# Patient Record
Sex: FEMALE | Race: WHITE | NOT HISPANIC OR LATINO | Employment: UNEMPLOYED | URBAN - METROPOLITAN AREA
[De-identification: names, ages, dates, MRNs, and addresses within clinical notes are randomized per-mention and may not be internally consistent; named-entity substitution may affect disease eponyms.]

---

## 2021-08-30 PROBLEM — D68.00 VON WILLEBRAND DISEASE (HCC): Status: ACTIVE | Noted: 2021-08-30

## 2024-01-08 ENCOUNTER — TELEPHONE (OUTPATIENT)
Age: 24
End: 2024-01-08

## 2024-01-08 NOTE — TELEPHONE ENCOUNTER
Pt returned office phone call. She is scheduling her physical- spoke with office clerical who states ok to make appointment longer.     Pt was out of state with  in the [employer]. She would like to discuss her health. Pt will need to update her last name and insurance information. I believe that the insurance is coming up as rejected because her last name has changed.     Beneficiary DOD # [id number] Benefits ID # [id number]. Please update insurance information once correct documentation is brought in.

## 2024-01-09 ENCOUNTER — OFFICE VISIT (OUTPATIENT)
Dept: FAMILY MEDICINE CLINIC | Facility: CLINIC | Age: 24
End: 2024-01-09
Payer: OTHER GOVERNMENT

## 2024-01-09 VITALS
OXYGEN SATURATION: 100 % | DIASTOLIC BLOOD PRESSURE: 74 MMHG | WEIGHT: 115 LBS | SYSTOLIC BLOOD PRESSURE: 114 MMHG | BODY MASS INDEX: 21.71 KG/M2 | TEMPERATURE: 98.4 F | HEIGHT: 61 IN | HEART RATE: 87 BPM

## 2024-01-09 DIAGNOSIS — Z13.220 SCREENING FOR LIPOID DISORDERS: ICD-10-CM

## 2024-01-09 DIAGNOSIS — N93.8 DYSFUNCTIONAL UTERINE BLEEDING: Primary | ICD-10-CM

## 2024-01-09 PROCEDURE — 99213 OFFICE O/P EST LOW 20 MIN: CPT | Performed by: FAMILY MEDICINE

## 2024-01-09 NOTE — PROGRESS NOTES
"Name: Cece Butler      : 2000      MRN: [id number]  Encounter Provider: Alondra Peralta DO  Encounter Date: 2024   Encounter department: [facility]    Assessment & Plan     1. Dysfunctional uterine bleeding  Comments:  labs as ordered  refer to gyn  Orders:  -     CBC and differential; Future  -     Comprehensive metabolic panel; Future  -     TSH, 3rd generation; Future  -     Iron Panel (Includes Ferritin, Iron Sat%, Iron, and TIBC); Future  -     Ambulatory Referral to Obstetrics / Gynecology; Future  -     hCG, quantitative; Future    2. Screening for lipoid disorders  -     Lipid panel; Future           Subjective      HPI  Pt presents c/o period lasting 2 weeks.  Post-partum, she was on depo but stopped and has had regular periods until now.  Had been somewhat irregular before baby 2 yrs ago.  Would like referral back to gyn.  No abd pain.        Review of Systems  See hpi  Current Outpatient Medications on File Prior to Visit   Medication Sig    [DISCONTINUED] medroxyPROGESTERone (DEPO-PROVERA) 150 mg/mL injection Inject 1 mL (150 mg total) into a muscle once for 1 dose       Objective     /74 (BP Location: Right arm, Patient Position: Sitting, Cuff Size: Standard)   Pulse 87   Temp 98.4 °F (36.9 °C) (Temporal)   Ht 5' 1\" (1.549 m)   Wt 52.2 kg (115 lb)   SpO2 100%   BMI 21.73 kg/m²     Physical Exam  Constitutional:       Appearance: Normal appearance.   HENT:      Head: Normocephalic and atraumatic.   Neck:      Thyroid: No thyromegaly.   Cardiovascular:      Rate and Rhythm: Normal rate and regular rhythm.      Heart sounds: No murmur heard.     No friction rub. No gallop.   Pulmonary:      Effort: Pulmonary effort is normal.      Breath sounds: Normal breath sounds. No wheezing, rhonchi or rales.   Neurological:      General: No focal deficit present.      Mental Status: She is alert and oriented to person, place, and time.       Alondra Peralta DO    "

## 2024-01-10 ENCOUNTER — APPOINTMENT (OUTPATIENT)
Dept: LAB | Facility: CLINIC | Age: 24
End: 2024-01-10
Payer: OTHER GOVERNMENT

## 2024-01-10 ENCOUNTER — PATIENT MESSAGE (OUTPATIENT)
Dept: FAMILY MEDICINE CLINIC | Facility: CLINIC | Age: 24
End: 2024-01-10

## 2024-01-10 DIAGNOSIS — N93.8 DYSFUNCTIONAL UTERINE BLEEDING: ICD-10-CM

## 2024-01-10 DIAGNOSIS — Z13.220 SCREENING FOR LIPOID DISORDERS: ICD-10-CM

## 2024-01-10 LAB
ALBUMIN SERPL BCP-MCNC: 4.7 G/DL (ref 3.5–5)
ALP SERPL-CCNC: 40 U/L (ref 34–104)
ALT SERPL W P-5'-P-CCNC: 8 U/L (ref 7–52)
ANION GAP SERPL CALCULATED.3IONS-SCNC: 7 MMOL/L
AST SERPL W P-5'-P-CCNC: 13 U/L (ref 13–39)
B-HCG SERPL-ACNC: <1 MIU/ML (ref 0–5)
BASOPHILS # BLD AUTO: 0.04 THOUSANDS/ÂΜL (ref 0–0.1)
BASOPHILS NFR BLD AUTO: 1 % (ref 0–1)
BILIRUB SERPL-MCNC: 0.71 MG/DL (ref 0.2–1)
BUN SERPL-MCNC: 7 MG/DL (ref 5–25)
CALCIUM SERPL-MCNC: 9.3 MG/DL (ref 8.4–10.2)
CHLORIDE SERPL-SCNC: 107 MMOL/L (ref 96–108)
CHOLEST SERPL-MCNC: 174 MG/DL
CO2 SERPL-SCNC: 25 MMOL/L (ref 21–32)
CREAT SERPL-MCNC: 0.62 MG/DL (ref 0.6–1.3)
EOSINOPHIL # BLD AUTO: 0.31 THOUSAND/ÂΜL (ref 0–0.61)
EOSINOPHIL NFR BLD AUTO: 6 % (ref 0–6)
ERYTHROCYTE [DISTWIDTH] IN BLOOD BY AUTOMATED COUNT: 12.5 % (ref 11.6–15.1)
FERRITIN SERPL-MCNC: 20 NG/ML (ref 11–307)
GFR SERPL CREATININE-BSD FRML MDRD: 128 ML/MIN/1.73SQ M
GLUCOSE P FAST SERPL-MCNC: 81 MG/DL (ref 65–99)
HCT VFR BLD AUTO: 41.9 % (ref 34.8–46.1)
HDLC SERPL-MCNC: 52 MG/DL
HGB BLD-MCNC: 13.7 G/DL (ref 11.5–15.4)
IMM GRANULOCYTES # BLD AUTO: 0.01 THOUSAND/UL (ref 0–0.2)
IMM GRANULOCYTES NFR BLD AUTO: 0 % (ref 0–2)
IRON SATN MFR SERPL: 31 % (ref 15–50)
IRON SERPL-MCNC: 109 UG/DL (ref 50–212)
LDLC SERPL CALC-MCNC: 105 MG/DL (ref 0–100)
LYMPHOCYTES # BLD AUTO: 2.18 THOUSANDS/ÂΜL (ref 0.6–4.47)
LYMPHOCYTES NFR BLD AUTO: 43 % (ref 14–44)
MCH RBC QN AUTO: 30.1 PG (ref 26.8–34.3)
MCHC RBC AUTO-ENTMCNC: 32.7 G/DL (ref 31.4–37.4)
MCV RBC AUTO: 92 FL (ref 82–98)
MONOCYTES # BLD AUTO: 0.27 THOUSAND/ÂΜL (ref 0.17–1.22)
MONOCYTES NFR BLD AUTO: 5 % (ref 4–12)
NEUTROPHILS # BLD AUTO: 2.31 THOUSANDS/ÂΜL (ref 1.85–7.62)
NEUTS SEG NFR BLD AUTO: 45 % (ref 43–75)
NONHDLC SERPL-MCNC: 122 MG/DL
NRBC BLD AUTO-RTO: 0 /100 WBCS
PLATELET # BLD AUTO: 250 THOUSANDS/UL (ref 149–390)
PMV BLD AUTO: 11.7 FL (ref 8.9–12.7)
POTASSIUM SERPL-SCNC: 3.8 MMOL/L (ref 3.5–5.3)
PROT SERPL-MCNC: 6.6 G/DL (ref 6.4–8.4)
RBC # BLD AUTO: 4.55 MILLION/UL (ref 3.81–5.12)
SODIUM SERPL-SCNC: 139 MMOL/L (ref 135–147)
TIBC SERPL-MCNC: 356 UG/DL (ref 250–450)
TRIGL SERPL-MCNC: 85 MG/DL
TSH SERPL DL<=0.05 MIU/L-ACNC: 0.54 UIU/ML (ref 0.45–4.5)
UIBC SERPL-MCNC: 247 UG/DL (ref 155–355)
WBC # BLD AUTO: 5.12 THOUSAND/UL (ref 4.31–10.16)

## 2024-01-10 PROCEDURE — 84702 CHORIONIC GONADOTROPIN TEST: CPT

## 2024-01-10 PROCEDURE — 36415 COLL VENOUS BLD VENIPUNCTURE: CPT

## 2024-01-10 PROCEDURE — 83550 IRON BINDING TEST: CPT

## 2024-01-10 PROCEDURE — 84443 ASSAY THYROID STIM HORMONE: CPT

## 2024-01-10 PROCEDURE — 85025 COMPLETE CBC W/AUTO DIFF WBC: CPT

## 2024-01-10 PROCEDURE — 83540 ASSAY OF IRON: CPT

## 2024-01-10 PROCEDURE — 80061 LIPID PANEL: CPT

## 2024-01-10 PROCEDURE — 80053 COMPREHEN METABOLIC PANEL: CPT

## 2024-01-10 PROCEDURE — 82728 ASSAY OF FERRITIN: CPT

## 2024-01-19 DIAGNOSIS — Z30.42 ENCOUNTER FOR SURVEILLANCE OF INJECTABLE CONTRACEPTIVE: Primary | ICD-10-CM

## 2024-01-19 RX ORDER — MEDROXYPROGESTERONE ACETATE 150 MG/ML
150 INJECTION, SUSPENSION INTRAMUSCULAR
Qty: 1 ML | Refills: 3 | Status: SHIPPED | OUTPATIENT
Start: 2024-01-19

## 2024-01-23 ENCOUNTER — CLINICAL SUPPORT (OUTPATIENT)
Dept: OBGYN CLINIC | Facility: CLINIC | Age: 24
End: 2024-01-23
Payer: OTHER GOVERNMENT

## 2024-01-23 VITALS — DIASTOLIC BLOOD PRESSURE: 60 MMHG | SYSTOLIC BLOOD PRESSURE: 90 MMHG

## 2024-01-23 DIAGNOSIS — Z30.42 ENCOUNTER FOR MANAGEMENT AND INJECTION OF DEPO-PROVERA: Primary | ICD-10-CM

## 2024-01-23 PROCEDURE — 96372 THER/PROPH/DIAG INJ SC/IM: CPT

## 2024-01-23 RX ORDER — MEDROXYPROGESTERONE ACETATE 150 MG/ML
150 INJECTION, SUSPENSION INTRAMUSCULAR ONCE
Status: COMPLETED | OUTPATIENT
Start: 2024-01-23 | End: 2024-01-23

## 2024-01-23 RX ADMIN — MEDROXYPROGESTERONE ACETATE 150 MG: 150 INJECTION, SUSPENSION INTRAMUSCULAR at 10:12

## 2024-01-23 NOTE — PROGRESS NOTES
Cece presents to the office for the management and administration of DepoProvera. She last had the injection 10/27/2022 while living in [place], and decided that she wanted to take some time off of it. She has recently moved back to the area.     Last yearly: N/A  Last DepoProvera: 10/27/2022  S/Sx: patient reported headaches  UPT: negative  Next yearly: 01/25/2024  Next DepoProvera: 12 weeks from now    DepoProvera 150mg/1mL given in the Left Ventrogluteal per patient's request  NDC: 65625-175-50  LOT #: [id number]  EXP 09/27/2027    Injection given without incident. Patient given dates of when her next injection should be given. Will schedule at another time.

## 2024-02-15 ENCOUNTER — APPOINTMENT (EMERGENCY)
Dept: ULTRASOUND IMAGING | Facility: HOSPITAL | Age: 24
End: 2024-02-15
Payer: COMMERCIAL

## 2024-02-15 ENCOUNTER — HOSPITAL ENCOUNTER (EMERGENCY)
Facility: HOSPITAL | Age: 24
Discharge: HOME | End: 2024-02-15
Attending: EMERGENCY MEDICINE
Payer: COMMERCIAL

## 2024-02-15 VITALS
OXYGEN SATURATION: 97 % | RESPIRATION RATE: 20 BRPM | TEMPERATURE: 97.8 F | SYSTOLIC BLOOD PRESSURE: 120 MMHG | DIASTOLIC BLOOD PRESSURE: 79 MMHG | HEART RATE: 58 BPM

## 2024-02-15 DIAGNOSIS — Z34.90 INTRAUTERINE PREGNANCY: Primary | ICD-10-CM

## 2024-02-15 DIAGNOSIS — R79.89 KETONEMIA: ICD-10-CM

## 2024-02-15 DIAGNOSIS — R80.9 PROTEINURIA: ICD-10-CM

## 2024-02-15 DIAGNOSIS — R10.9 ACUTE ABDOMINAL PAIN: ICD-10-CM

## 2024-02-15 DIAGNOSIS — R11.2 NAUSEA AND VOMITING: ICD-10-CM

## 2024-02-15 PROBLEM — O21.9 NAUSEA AND VOMITING DURING PREGNANCY PRIOR TO 22 WEEKS GESTATION: Status: ACTIVE | Noted: 2024-02-15

## 2024-02-15 PROBLEM — Z3A.01 LESS THAN 8 WEEKS GESTATION OF PREGNANCY: Status: ACTIVE | Noted: 2024-02-15

## 2024-02-15 LAB
ALBUMIN SERPL BCP-MCNC: 5.1 G/DL (ref 3.5–5)
ALP SERPL-CCNC: 38 U/L (ref 34–104)
ALT SERPL W P-5'-P-CCNC: 7 U/L (ref 7–52)
ANION GAP SERPL CALCULATED.3IONS-SCNC: 15 MMOL/L
AST SERPL W P-5'-P-CCNC: 12 U/L (ref 13–39)
B-HCG SERPL-ACNC: ABNORMAL MIU/ML (ref 0–5)
BACTERIA UR QL AUTO: ABNORMAL /HPF
BASOPHILS # BLD AUTO: 0.03 THOUSANDS/ÂΜL (ref 0–0.1)
BASOPHILS NFR BLD AUTO: 0 % (ref 0–1)
BILIRUB SERPL-MCNC: 1.08 MG/DL (ref 0.2–1)
BILIRUB UR QL STRIP: NEGATIVE
BUN SERPL-MCNC: 10 MG/DL (ref 5–25)
CALCIUM SERPL-MCNC: 10.2 MG/DL (ref 8.4–10.2)
CHLORIDE SERPL-SCNC: 102 MMOL/L (ref 96–108)
CLARITY UR: ABNORMAL
CO2 SERPL-SCNC: 19 MMOL/L (ref 21–32)
COLOR UR: YELLOW
CREAT SERPL-MCNC: 0.5 MG/DL (ref 0.6–1.3)
EOSINOPHIL # BLD AUTO: 0 THOUSAND/ÂΜL (ref 0–0.61)
EOSINOPHIL NFR BLD AUTO: 0 % (ref 0–6)
ERYTHROCYTE [DISTWIDTH] IN BLOOD BY AUTOMATED COUNT: 11.9 % (ref 11.6–15.1)
EXT PREGNANCY TEST URINE: POSITIVE
EXT. CONTROL: ABNORMAL
GFR SERPL CREATININE-BSD FRML MDRD: 137 ML/MIN/1.73SQ M
GLUCOSE SERPL-MCNC: 106 MG/DL (ref 65–140)
GLUCOSE UR STRIP-MCNC: ABNORMAL MG/DL
HCG SERPL QL: POSITIVE
HCT VFR BLD AUTO: 40 % (ref 34.8–46.1)
HGB BLD-MCNC: 14.5 G/DL (ref 11.5–15.4)
HGB UR QL STRIP.AUTO: ABNORMAL
IMM GRANULOCYTES # BLD AUTO: 0.05 THOUSAND/UL (ref 0–0.2)
IMM GRANULOCYTES NFR BLD AUTO: 1 % (ref 0–2)
KETONES UR STRIP-MCNC: ABNORMAL MG/DL
LEUKOCYTE ESTERASE UR QL STRIP: NEGATIVE
LIPASE SERPL-CCNC: <6 U/L (ref 11–82)
LYMPHOCYTES # BLD AUTO: 1.34 THOUSANDS/ÂΜL (ref 0.6–4.47)
LYMPHOCYTES NFR BLD AUTO: 13 % (ref 14–44)
MCH RBC QN AUTO: 31.1 PG (ref 26.8–34.3)
MCHC RBC AUTO-ENTMCNC: 36.3 G/DL (ref 31.4–37.4)
MCV RBC AUTO: 86 FL (ref 82–98)
MONOCYTES # BLD AUTO: 0.31 THOUSAND/ÂΜL (ref 0.17–1.22)
MONOCYTES NFR BLD AUTO: 3 % (ref 4–12)
MUCOUS THREADS UR QL AUTO: ABNORMAL
NEUTROPHILS # BLD AUTO: 8.99 THOUSANDS/ÂΜL (ref 1.85–7.62)
NEUTS SEG NFR BLD AUTO: 83 % (ref 43–75)
NITRITE UR QL STRIP: NEGATIVE
NON-SQ EPI CELLS URNS QL MICRO: ABNORMAL /HPF
NRBC BLD AUTO-RTO: 0 /100 WBCS
PH UR STRIP.AUTO: 6 [PH]
PLATELET # BLD AUTO: 353 THOUSANDS/UL (ref 149–390)
PMV BLD AUTO: 11.7 FL (ref 8.9–12.7)
POTASSIUM SERPL-SCNC: 3.4 MMOL/L (ref 3.5–5.3)
PROT SERPL-MCNC: 7.6 G/DL (ref 6.4–8.4)
PROT UR STRIP-MCNC: ABNORMAL MG/DL
RBC # BLD AUTO: 4.66 MILLION/UL (ref 3.81–5.12)
RBC #/AREA URNS AUTO: ABNORMAL /HPF
SODIUM SERPL-SCNC: 136 MMOL/L (ref 135–147)
SP GR UR STRIP.AUTO: 1.04 (ref 1–1.03)
UROBILINOGEN UR STRIP-ACNC: 2 MG/DL
WBC # BLD AUTO: 10.72 THOUSAND/UL (ref 4.31–10.16)
WBC #/AREA URNS AUTO: ABNORMAL /HPF

## 2024-02-15 PROCEDURE — 84703 CHORIONIC GONADOTROPIN ASSAY: CPT | Performed by: EMERGENCY MEDICINE

## 2024-02-15 PROCEDURE — 80053 COMPREHEN METABOLIC PANEL: CPT | Performed by: EMERGENCY MEDICINE

## 2024-02-15 PROCEDURE — 85025 COMPLETE CBC W/AUTO DIFF WBC: CPT | Performed by: EMERGENCY MEDICINE

## 2024-02-15 PROCEDURE — 83690 ASSAY OF LIPASE: CPT | Performed by: EMERGENCY MEDICINE

## 2024-02-15 PROCEDURE — 81001 URINALYSIS AUTO W/SCOPE: CPT | Performed by: EMERGENCY MEDICINE

## 2024-02-15 PROCEDURE — 36415 COLL VENOUS BLD VENIPUNCTURE: CPT | Performed by: EMERGENCY MEDICINE

## 2024-02-15 PROCEDURE — 96361 HYDRATE IV INFUSION ADD-ON: CPT

## 2024-02-15 PROCEDURE — 93005 ELECTROCARDIOGRAM TRACING: CPT

## 2024-02-15 PROCEDURE — 96375 TX/PRO/DX INJ NEW DRUG ADDON: CPT

## 2024-02-15 PROCEDURE — 76801 OB US < 14 WKS SINGLE FETUS: CPT

## 2024-02-15 PROCEDURE — 96365 THER/PROPH/DIAG IV INF INIT: CPT

## 2024-02-15 PROCEDURE — 81025 URINE PREGNANCY TEST: CPT | Performed by: EMERGENCY MEDICINE

## 2024-02-15 PROCEDURE — NC001 PR NO CHARGE: Performed by: OBSTETRICS & GYNECOLOGY

## 2024-02-15 PROCEDURE — 99284 EMERGENCY DEPT VISIT MOD MDM: CPT

## 2024-02-15 PROCEDURE — 84702 CHORIONIC GONADOTROPIN TEST: CPT | Performed by: EMERGENCY MEDICINE

## 2024-02-15 PROCEDURE — 99285 EMERGENCY DEPT VISIT HI MDM: CPT | Performed by: EMERGENCY MEDICINE

## 2024-02-15 RX ORDER — ACETAMINOPHEN 10 MG/ML
1000 INJECTION, SOLUTION INTRAVENOUS ONCE
Status: COMPLETED | OUTPATIENT
Start: 2024-02-15 | End: 2024-02-15

## 2024-02-15 RX ORDER — METOCLOPRAMIDE 10 MG/1
10 TABLET ORAL 4 TIMES DAILY
Qty: 120 TABLET | Refills: 0 | Status: SHIPPED | OUTPATIENT
Start: 2024-02-15 | End: 2024-03-16

## 2024-02-15 RX ORDER — ONDANSETRON 4 MG/1
4 TABLET, FILM COATED ORAL EVERY 8 HOURS PRN
Qty: 12 TABLET
Start: 2024-02-15

## 2024-02-15 RX ORDER — PYRIDOXINE HCL (VITAMIN B6) 50 MG
25 TABLET ORAL DAILY
Status: DISCONTINUED | OUTPATIENT
Start: 2024-02-15 | End: 2024-02-15 | Stop reason: HOSPADM

## 2024-02-15 RX ORDER — ONDANSETRON 2 MG/ML
4 INJECTION INTRAMUSCULAR; INTRAVENOUS ONCE
Status: COMPLETED | OUTPATIENT
Start: 2024-02-15 | End: 2024-02-15

## 2024-02-15 RX ORDER — DEXTROSE AND SODIUM CHLORIDE 5; .9 G/100ML; G/100ML
100 INJECTION, SOLUTION INTRAVENOUS CONTINUOUS
Status: DISCONTINUED | OUTPATIENT
Start: 2024-02-15 | End: 2024-02-15 | Stop reason: HOSPADM

## 2024-02-15 RX ORDER — METOCLOPRAMIDE HYDROCHLORIDE 5 MG/ML
10 INJECTION INTRAMUSCULAR; INTRAVENOUS ONCE
Status: COMPLETED | OUTPATIENT
Start: 2024-02-15 | End: 2024-02-15

## 2024-02-15 RX ORDER — DROPERIDOL 2.5 MG/ML
0.62 INJECTION, SOLUTION INTRAMUSCULAR; INTRAVENOUS ONCE
Status: DISCONTINUED | OUTPATIENT
Start: 2024-02-15 | End: 2024-02-15

## 2024-02-15 RX ADMIN — METOCLOPRAMIDE 10 MG: 5 INJECTION, SOLUTION INTRAMUSCULAR; INTRAVENOUS at 14:38

## 2024-02-15 RX ADMIN — ACETAMINOPHEN 1000 MG: 1000 INJECTION INTRAVENOUS at 15:04

## 2024-02-15 RX ADMIN — DEXTROSE AND SODIUM CHLORIDE 100 ML/HR: 5; .9 INJECTION, SOLUTION INTRAVENOUS at 16:24

## 2024-02-15 RX ADMIN — ONDANSETRON 4 MG: 2 INJECTION INTRAMUSCULAR; INTRAVENOUS at 17:38

## 2024-02-15 RX ADMIN — SODIUM CHLORIDE 500 ML: 0.9 INJECTION, SOLUTION INTRAVENOUS at 14:20

## 2024-02-15 NOTE — ED PROVIDER NOTES
History  Chief Complaint   Patient presents with    Vomiting     Vomiting and generalized abd pain x3 days.      Patient is a 23-year-old female, with a history significant for tobacco use, von Willebrand's,  section, multiple ED visits for nausea vomiting per my review of the medical record, who presents to the ED today due to a 3-day history of gradual onset, constant, progressively worsening, nonradiating, atraumatic generalized abdominal pain.  There is associated nonbloody emesis, decreased p.o. intake, constipation x 1 week.  Patient states she has not had a menstrual period since around [place] time.  There is no associated fever, chest pain, dyspnea, dysuria, polyuria, vaginal bleeding, vaginal discharge, marijuana use, weakness, numbness.  Patient's mother, present in room and providing collateral history, states that patient is not confused.  She also states that she gave the patient a Reglan and Tums and this did not remit her symptoms.  No clear exacerbating factors.  Patient denies history of abdominal surgery other than the .  Patient is without other concerns at this time.        Prior to Admission Medications   Prescriptions Last Dose Informant Patient Reported? Taking?   medroxyPROGESTERone (DEPO-PROVERA) 150 mg/mL injection   No No   Sig: Inject 1 mL (150 mg total) into a muscle every 3 (three) months      Facility-Administered Medications: None       Past Medical History:   Diagnosis Date    Varicella     immunized    Von Willebrand disease (HCC)        Past Surgical History:   Procedure Laterality Date     SECTION  22    CA  DELIVERY ONLY N/A 2022    Procedure:  SECTION ();  Surgeon: Didi Almazan MD;  Location: [facility] ;  Service: Obstetrics       Family History   Problem Relation Age of Onset    No Known Problems Mother     COPD Father     No Known Problems Sister     No Known Problems Maternal Grandmother     Supraventricular  tachycardia Maternal Grandfather     No Known Problems Paternal Grandmother     Prostate cancer Paternal Grandfather      I have reviewed and agree with the history as documented.    E-Cigarette/Vaping    E-Cigarette Use Former User      E-Cigarette/Vaping Substances    Nicotine No     THC No     CBD No     Flavoring No     Other No     Unknown No      Social History     Tobacco Use    Smoking status: Some Days    Smokeless tobacco: Never   Vaping Use    Vaping status: Former   Substance Use Topics    Alcohol use: Never    Drug use: Yes     Types: Marijuana     Comment: 1.5 years ago       Review of Systems   Constitutional:  Negative for fever.   HENT:  Negative for trouble swallowing.    Eyes:  Negative for visual disturbance.   Respiratory:  Negative for shortness of breath.    Cardiovascular:  Negative for chest pain.   Gastrointestinal:  Positive for abdominal pain, constipation, nausea and vomiting.   Endocrine: Negative for polyuria.   Genitourinary:  Negative for dysuria, vaginal bleeding and vaginal discharge.   Musculoskeletal:  Negative for gait problem.   Skin:  Negative for rash.   Allergic/Immunologic: Negative for environmental allergies.   Neurological:  Negative for weakness and numbness.   Hematological:  Negative for adenopathy.   Psychiatric/Behavioral:  Negative for confusion.    All other systems reviewed and are negative.      Physical Exam  Physical Exam  Vitals and nursing note reviewed.   Constitutional:       General: She is not in acute distress.     Appearance: She is ill-appearing. She is not toxic-appearing.      Comments: Patient appears uncomfortable during my evaluation     Wt Readings from Last 3 Encounters:  01/09/24 : 52.2 kg (115 lb)  03/25/22 : 62 kg (136 lb 9.6 oz)  03/07/22 : 65 kg (143 lb 6.4 oz)      HENT:      Head: Normocephalic and atraumatic.      Right Ear: External ear normal.      Left Ear: External ear normal.      Nose: Nose normal. No rhinorrhea.       Mouth/Throat:      Mouth: Mucous membranes are dry.      Pharynx: Oropharynx is clear. No oropharyngeal exudate or posterior oropharyngeal erythema.      Comments: Uvula midline. No oropharyngeal or submandibular mass/swelling  Eyes:      General: No scleral icterus.        Right eye: No discharge.         Left eye: No discharge.      Conjunctiva/sclera: Conjunctivae normal.      Pupils: Pupils are equal, round, and reactive to light.   Neck:      Comments: Patient is spontaneously rotating their neck to the left and right during the history and physical exam interaction without difficulty or apparent discomfort    Cardiovascular:      Rate and Rhythm: Normal rate and regular rhythm.      Pulses: Normal pulses.      Heart sounds: Normal heart sounds. No murmur heard.     No friction rub. No gallop.      Comments: 2+ Radial  Pulmonary:      Effort: Pulmonary effort is normal. No respiratory distress.      Breath sounds: Normal breath sounds. No stridor. No wheezing, rhonchi or rales.   Abdominal:      General: Abdomen is flat. There is no distension.      Palpations: Abdomen is soft.      Tenderness: There is abdominal tenderness (Generalized). There is left CVA tenderness and guarding (LLQ). There is no right CVA tenderness or rebound.   Musculoskeletal:         General: No deformity.      Cervical back: Neck supple. No tenderness. No muscular tenderness.   Lymphadenopathy:      Cervical: No cervical adenopathy.   Skin:     General: Skin is warm and dry.      Capillary Refill: Capillary refill takes less than 2 seconds.   Neurological:      Mental Status: She is alert.      Comments: Patient is speaking clearly in complete sentences.  Patient is answering appropriately and able follow commands.  Patient is moving all four extremities spontaneously.  No facial droop.  Tongue midline.      Psychiatric:         Mood and Affect: Mood normal.         Behavior: Behavior normal.         Vital Signs  ED Triage Vitals    Temperature Pulse Respirations Blood Pressure SpO2   02/15/24 1355 02/15/24 1355 02/15/24 1355 02/15/24 1420 02/15/24 1355   97.8 °F (36.6 °C) 62 18 125/82 96 %      Temp Source Heart Rate Source Patient Position - Orthostatic VS BP Location FiO2 (%)   02/15/24 1355 02/15/24 1355 02/15/24 1355 02/15/24 1355 --   Oral Monitor Sitting Right arm       Pain Score       02/15/24 1506       8           Vitals:    02/15/24 1355 02/15/24 1420 02/15/24 1506 02/15/24 1848   BP:  125/82 104/59 120/79   Pulse: 62  (!) 53 58   Patient Position - Orthostatic VS: Sitting   Sitting         Visual Acuity      ED Medications  Medications   dextrose 5 % and sodium chloride 0.9 % infusion (100 mL/hr Intravenous New Bag 2/15/24 1624)   pyridoxine (VITAMIN B6) tablet 25 mg (25 mg Oral Not Given 2/15/24 1722)   sodium chloride 0.9 % bolus 500 mL (0 mL Intravenous Stopped 2/15/24 1619)   metoclopramide (REGLAN) injection 10 mg (10 mg Intravenous Given 2/15/24 1438)   acetaminophen (Ofirmev) injection 1,000 mg (0 mg Intravenous Stopped 2/15/24 1556)   ondansetron (ZOFRAN) injection 4 mg (4 mg Intravenous Given 2/15/24 1738)       Diagnostic Studies  Results Reviewed       Procedure Component Value Units Date/Time    hCG, quantitative [[id number]]  (Abnormal) Collected: 02/15/24 1420    Lab Status: Final result Specimen: Blood from Arm, Right Updated: 02/15/24 1622     HCG, Quant 60,251 mIU/mL     Narrative:       Expected Ranges:    HCG results between 5 and 25 mIU/mL may be indicative of early pregnancy but should be interpreted in light of the total clinical presentation.    HCG can rise to detectable levels in [place] and post menopausal women (0-11.6 mIU/mL).     Approximate               Approximate HCG  Gestation age          Concentration ( mIU/mL)  _____________          ______________________   Weeks                      HCG values  0.2-1                       5-50  1-2                           2-3                          100-5000  3-4                         500-28550  4-5                         1000-16560  5-6                         17124-161018  6-8                         15919-661107  8-12                        97887-716978      hCG, qualitative pregnancy [[id number]]  (Abnormal) Collected: 02/15/24 1420    Lab Status: Final result Specimen: Blood from Arm, Right Updated: 02/15/24 1557     Preg, Serum Positive    Urine Microscopic [[id number]]  (Abnormal) Collected: 02/15/24 1446    Lab Status: Final result Specimen: Urine, Clean Catch Updated: 02/15/24 1504     RBC, UA 2-4 /hpf      WBC, UA 4-10 /hpf      Epithelial Cells Moderate /hpf      Bacteria, UA None Seen /hpf      MUCUS THREADS Innumerable    POCT pregnancy, urine [[id number]]  (Abnormal) Resulted: 02/15/24 1446    Lab Status: Final result Updated: 02/15/24 1503     Control Valid     EXT Preg Test, Ur Positive    UA w Reflex to Microscopic w Reflex to Culture [[id number]]  (Abnormal) Collected: 02/15/24 1446    Lab Status: Final result Specimen: Urine, Clean Catch Updated: 02/15/24 1458     Color, UA Yellow     Clarity, UA Turbid     Specific Gravity, UA 1.041     pH, UA 6.0     Leukocytes, UA Negative     Nitrite, UA Negative     Protein,  (2+) mg/dl      Glucose, UA Trace mg/dl      Ketones, UA >=150 (4+) mg/dl      Urobilinogen, UA 2.0 mg/dl      Bilirubin, UA Negative     Occult Blood, UA Trace    Comprehensive metabolic panel [[id number]]  (Abnormal) Collected: 02/15/24 1420    Lab Status: Final result Specimen: Blood from Arm, Right Updated: 02/15/24 1444     Sodium 136 mmol/L      Potassium 3.4 mmol/L      Chloride 102 mmol/L      CO2 19 mmol/L      ANION GAP 15 mmol/L      BUN 10 mg/dL      Creatinine 0.50 mg/dL      Glucose 106 mg/dL      Calcium 10.2 mg/dL      AST 12 U/L      ALT 7 U/L      Alkaline Phosphatase 38 U/L      Total Protein 7.6 g/dL      Albumin 5.1 g/dL      Total Bilirubin 1.08 mg/dL      eGFR 137 ml/min/1.73sq m     Narrative:       National Kidney Disease Foundation guidelines for Chronic Kidney Disease (CKD):     Stage 1 with normal or high GFR (GFR > 90 mL/min/1.73 square meters)    Stage 2 Mild CKD (GFR = 60-89 mL/min/1.73 square meters)    Stage 3A Moderate CKD (GFR = 45-59 mL/min/1.73 square meters)    Stage 3B Moderate CKD (GFR = 30-44 mL/min/1.73 square meters)    Stage 4 Severe CKD (GFR = 15-29 mL/min/1.73 square meters)    Stage 5 End Stage CKD (GFR <15 mL/min/1.73 square meters)  Note: GFR calculation is accurate only with a steady state creatinine    Lipase [[id number]]  (Abnormal) Collected: 02/15/24 1420    Lab Status: Final result Specimen: Blood from Arm, Right Updated: 02/15/24 1444     Lipase <6 u/L     CBC and differential [[id number]]  (Abnormal) Collected: 02/15/24 1420    Lab Status: Final result Specimen: Blood from Arm, Right Updated: 02/15/24 1426     WBC 10.72 Thousand/uL      RBC 4.66 Million/uL      Hemoglobin 14.5 g/dL      Hematocrit 40.0 %      MCV 86 fL      MCH 31.1 pg      MCHC 36.3 g/dL      RDW 11.9 %      MPV 11.7 fL      Platelets 353 Thousands/uL      nRBC 0 /100 WBCs      Neutrophils Relative 83 %      Immat GRANS % 1 %      Lymphocytes Relative 13 %      Monocytes Relative 3 %      Eosinophils Relative 0 %      Basophils Relative 0 %      Neutrophils Absolute 8.99 Thousands/µL      Immature Grans Absolute 0.05 Thousand/uL      Lymphocytes Absolute 1.34 Thousands/µL      Monocytes Absolute 0.31 Thousand/µL      Eosinophils Absolute 0.00 Thousand/µL      Basophils Absolute 0.03 Thousands/µL                    US OB < 14 weeks with transvaginal   Final Result by Vasile Barron MD (02/15 1628)      Single live intrauterine gestation at 6 weeks 4 days by crown-rump length (range +/- 4 days).      CRISTOPHER of 10/06/2024.         Workstation performed: [id number]                    Procedures  Procedures         ED Course  ED Course as of 02/15/24 1926   u Feb 15, 2024   1441 WBC(!): 10.72  WNL   1441  Hemoglobin: 14.5  WNL   1441 ECG per my independent interpretation: Normal rate, regular rhythm, no ectopy, normal axis, no ST elevations or depressions     1441 Positive pregnancy test: antiemetic changed to reglan as patient isn't tolerating PO and imaging updated to TVUS    1447 Creatinine(!): 0.50  WNL   1501 Results reviewed with patient and her mother with patient's permission.   1501 Ketones, UA(!): >=150 (4+)  High    1502 GLUCOSE: 106  WNL   1504 PREGNANCY TEST URINE(!): Positive  Abnormal    1505 Bacteria, UA: None Seen  WNL   1506 Per my review of the medical record, patient has lost about 10 kg since her last recorded weight send however, this is from    154 Per my review of the medical record, blood type O+ recorded 2022   1558 Given acute anemia/signs of dehydration/concern for hyperemesis gravidarum, D5 normal saline ordered.  Results to this point reviewed with patient and she reports improvement in abdominal pain and nausea but not quite resolved.   1722 I discussed case with Dr. Manjarrez from Ob/gyn   184 I discussed with Dr. Manjarrez from OB/GYN: As patient does not desire for admission, will p.o. challenge.  Patient also does not desire to keep the pregnancy, OB will set up follow-up   191 Patient tolerated p.o. in the ED   192 Patient feels well enough to go home and she appears much better on reevaluation. Patient has neither questions nor concerns after receiving discharge instructions and return precautions                                              Medical Decision Making  Patient is a 23-year-old female, with a history significant for tobacco use, von Willebrand's,  section, multiple ED visits for nausea vomiting per my review of the medical record, who presents to the ED today due to a 3-day history of gradual onset, constant, progressively worsening, nonradiating, atraumatic generalized abdominal pain.  There is associated nonbloody emesis, decreased p.o. intake,  constipation x 1 week.  Patient states she has not had a menstrual period since around [place] time.  There is no associated fever, chest pain, dyspnea, dysuria, polyuria, vaginal bleeding, vaginal discharge, marijuana use, weakness, numbness.  Patient's mother, present in room and providing collateral history, states that patient is not confused.  She also states that she gave the patient a Reglan and Tums and this did not remit her symptoms.  No clear exacerbating factors.  Patient denies history of abdominal surgery other than the .  Patient is currently afebrile and hemodynamically stable.  On exam, she is ill/very uncomfortable appearing.  Her exam is otherwise notable for dry mucous membranes, clear heart and lungs auscultation, generalized abdominal discomfort to palpation with focality/guarding in the left lower quadrant as well as left CVA tenderness.  This presentation is concerning for: Cyclic vomiting, dehydration, JENNIFER, electrolyte abnormality, pancreatitis, colitis, ovarian pathology/torsion, bowel obstruction, pregnancy/pregnancy complication, ketosis.  No reason to suspect PID at this time based on history physical exam.  Will investigate with CBC, CMP, UA, pregnancy test, lipase, CT imaging, ECG.  Will manage with fluids, droperidol as patient is already tried Reglan, further based upon workup.    Amount and/or Complexity of Data Reviewed  Labs: ordered. Decision-making details documented in ED Course.  Radiology: ordered.    Risk  OTC drugs.  Prescription drug management.             Disposition  Final diagnoses:   Intrauterine pregnancy   Acute abdominal pain   Nausea and vomiting   Ketonemia   Proteinuria     Time reflects when diagnosis was documented in both MDM as applicable and the Disposition within this note       Time User Action Codes Description Comment    2/15/2024  4:59 PM Dougie Galvez Add [Z34.90] Intrauterine pregnancy     2/15/2024  4:59 PM Dougie Galvez  Add [R10.9] Acute abdominal pain     2/15/2024  4:59 PM Dougie Galvez Add [R11.2] Nausea and vomiting     2/15/2024  5:46 PM Dougie Galvez Add [R79.89] Ketonemia     2/15/2024  6:48 PM Dougie Galvez Add [R80.9] Proteinuria           ED Disposition       ED Disposition   Discharge    Condition   Stable    Date/Time   Thu Feb 15, 2024  7:21 PM    Comment   Cece Butler discharge to home/self care.                   Follow-up Information       Follow up With Specialties Details Why Contact Info Additional Information    Alondra Peralta DO Family Medicine Schedule an appointment as soon as possible for a visit   [address]  [address]  [place] [address]  [phone]       [facility] Schedule an appointment as soon as possible for a visit  [address]  [address]  [facility] [id number] [phone] [facility], [address], [place], [place], [address]  [phone]    [facility] Schedule an appointment as soon as possible for a visit  [address]  [place] [address]  [phone]               Patient's Medications   Discharge Prescriptions    METOCLOPRAMIDE (REGLAN) 10 MG TABLET    Take 1 tablet (10 mg total) by mouth 4 (four) times a day       Start Date: 2/15/2024 End Date: 3/16/2024       Order Dose: 10 mg       Quantity: 120 tablet    Refills: 0    ONDANSETRON (ZOFRAN) 4 MG TABLET    Take 1 tablet (4 mg total) by mouth every 8 (eight) hours as needed for nausea or vomiting for up to 12 doses       Start Date: 2/15/2024 End Date: --       Order Dose: 4 mg       Quantity: 12 tablet    Refills: --       No discharge procedures on file.    PDMP Review       None            ED Provider  Electronically Signed by             Dougie Galvez MD  02/15/24 5094

## 2024-02-15 NOTE — ED NOTES
POCT urine preg positive, md aware, droperidol changed to reglan.      Theodora Kendall, RN  02/15/24 5898

## 2024-02-15 NOTE — CONSULTS
Consult - OB/GYN   Cece Butler 23 y.o. female MRN: [id number]  Unit/Bed#: ED-19 Encounter: [id number]    Assessment:   23 y.o.  sexually active reproductive aged female with nausea and vomiting in the setting of IUP at 6w.    Plan:   Nausea and vomiting during pregnancy prior to 22 weeks gestation  Assessment & Plan  N/V improved with IV reglan and zofran  IV fluids (D5 1/2 NS) given  PO challenge successful  Patient given Rx for reglan    Less than 8 weeks gestation of pregnancy  Assessment & Plan  IUP at 6w4d confirmed on pelvic US   Patient does not intend to keep this pregnancy  Information on [facility] and [facility] provided  Discussed medical management and expectations if this is what she chooses to do.  Patient currently has no other complaints and states her nausea and vomiting has improved.  Discussed other forms of contraception including IUD, Nexplanon, and OCPs      Discussed case and plan w/ Dr. Jeong    HPI:  Cece is a 22 yo  at 6w4d here with nausea and vomiting. Patient states that she did not know that she was pregnant and this was an unintended undesired pregnancy. She was previously on Depo-Provera. Cece has had 3 days of nausea and vomiting and inability to tolerate PO. She is a little upset at the thought of being pregnant and interested in termination.    Of note patient has a 2-year-old child at home and currently lives with her  her mother they are both unemployed.  She states that this is not a good time for her to be pregnant.     Active Problems:  Patient Active Problem List   Diagnosis    Von Willebrand disease (HCC)    Gastroesophageal reflux disease     PMH:  Past Medical History:   Diagnosis Date    Varicella     immunized    Von Willebrand disease (HCC)        PSH:  Past Surgical History:   Procedure Laterality Date     SECTION  22    [place]  DELIVERY ONLY N/A 2022    Procedure:  SECTION ();   Surgeon: Didi Almazan MD;  Location: [facility] ;  Service: Obstetrics       OB History  OB History    Para Term  AB Living   1 1 1     1   SAB IAB Ectopic Multiple Live Births         0 1      # Outcome Date GA Lbr Jamie/2nd Weight Sex Delivery Anes PTL Lv   1 Term 23 39w4d  3850 g (8 lb 7.8 oz) F CS-LTranv   LORI      Obstetric Comments   Menarche: 14     Meds:  No current facility-administered medications on file prior to encounter.     Current Outpatient Medications on File Prior to Encounter   Medication Sig Dispense Refill    medroxyPROGESTERone (DEPO-PROVERA) 150 mg/mL injection Inject 1 mL (150 mg total) into a muscle every 3 (three) months 1 mL 3       Allergies:  Allergies   Allergen Reactions    Other Hives     seafood       Physical Exam:  /59   Pulse (!) 53   Temp 97.8 °F (36.6 °C) (Oral)   Resp 20   LMP 2023 (Approximate) Comment: had period entire month of December  SpO2 100%     Physical Exam  Vitals reviewed.   Constitutional:       Appearance: Normal appearance.   HENT:      Head: Normocephalic and atraumatic.   Eyes:      Extraocular Movements: Extraocular movements intact.   Cardiovascular:      Rate and Rhythm: Normal rate.      Pulses: Normal pulses.   Pulmonary:      Effort: Pulmonary effort is normal. No respiratory distress.   Abdominal:      Palpations: Abdomen is soft.      Tenderness: There is no abdominal tenderness.   Musculoskeletal:         General: Normal range of motion.      Cervical back: Normal range of motion.   Skin:     General: Skin is warm and dry.   Neurological:      Mental Status: She is alert.   Psychiatric:         Mood and Affect: Mood normal.         Behavior: Behavior normal.       Edilma Manjarrez MD  OBGYN PGY-3  02/15/24 11:04 PM

## 2024-02-15 NOTE — DISCHARGE INSTRUCTIONS
You were evaluated in the emergency department for: nausea and vomiting. You can access your current and pending results through [facility]'s [employer]. A radiologist will take a second look at your X-Rays, if you had any, and you will be contacted with any new findings.     You should follow-up with your primary care provider and your Ob/Gyn, as soon as possible, for re-evaluation.  If you do not have a primary care provider, I have referred you to [facility]. You will be contacted about scheduling an appointment. Their phone number is also included on this paperwork.     Your workup revealed no emergent features at this time; however, many disease processes are dynamic:    Please, return to the emergency department if you experience new or worsening symptoms, fever, chest pain, shortness of breath, difficulty breathing, dizziness, abdominal pain, persistent nausea/vomiting, syncope or passing out, blood in your urine or stool, coughing up blood, leg swelling/pain, urinary retention, bowel or bladder incontinence, numbness between your legs.

## 2024-02-16 LAB
ATRIAL RATE: 55 BPM
P AXIS: 74 DEGREES
PR INTERVAL: 112 MS
QRS AXIS: 78 DEGREES
QRSD INTERVAL: 86 MS
QT INTERVAL: 444 MS
QTC INTERVAL: 424 MS
T WAVE AXIS: 53 DEGREES
VENTRICULAR RATE: 55 BPM

## 2024-02-16 PROCEDURE — 93010 ELECTROCARDIOGRAM REPORT: CPT | Performed by: INTERNAL MEDICINE

## 2024-02-16 NOTE — ASSESSMENT & PLAN NOTE
N/V improved with IV reglan and zofran  IV fluids (D5 1/2 NS) given  PO challenge successful  Patient given Rx for reglan

## 2024-02-16 NOTE — ASSESSMENT & PLAN NOTE
IUP at 6w4d confirmed on pelvic US   Patient does not intend to keep this pregnancy  Information on [facility] and [facility] provided  Discussed medical management and expectations if this is what she chooses to do.  Patient currently has no other complaints and states her nausea and vomiting has improved.  Discussed other forms of contraception including IUD, Nexplanon, and OCPs

## 2024-02-19 ENCOUNTER — TELEPHONE (OUTPATIENT)
Dept: OBGYN CLINIC | Facility: CLINIC | Age: 24
End: 2024-02-19

## 2024-02-19 NOTE — TELEPHONE ENCOUNTER
----- Message from Cece Carrie sent at 2/16/2024 10:05 PM EST -----  Regarding: I'm pregnant   Contact: [phone]  hello! I got blood work from my primary care then a pee test in your office then got depo. but I am pregnant !!     I was in the emergency room puking my guts up and they told me I was 6 weeks four days pregnant. Last night I started bleeding, maybe because I got an internal but I want to try and get an appointment ASAP to make sure the baby is ok. if you have any cancellations I will be calling to take the appointment     thank you

## 2024-02-21 ENCOUNTER — NURSE TRIAGE (OUTPATIENT)
Age: 24
End: 2024-02-21

## 2024-02-21 ENCOUNTER — APPOINTMENT (OUTPATIENT)
Dept: LAB | Facility: AMBULARY SURGERY CENTER | Age: 24
End: 2024-02-21
Payer: COMMERCIAL

## 2024-02-21 ENCOUNTER — INITIAL PRENATAL (OUTPATIENT)
Dept: OBGYN CLINIC | Facility: CLINIC | Age: 24
End: 2024-02-21
Payer: COMMERCIAL

## 2024-02-21 VITALS
SYSTOLIC BLOOD PRESSURE: 110 MMHG | WEIGHT: 109 LBS | BODY MASS INDEX: 20.58 KG/M2 | DIASTOLIC BLOOD PRESSURE: 70 MMHG | HEIGHT: 61 IN

## 2024-02-21 DIAGNOSIS — Z30.09 COUNSELING FOR BIRTH CONTROL REGARDING INTRAUTERINE DEVICE (IUD): ICD-10-CM

## 2024-02-21 DIAGNOSIS — O03.9 COMPLETE SPONTANEOUS ABORTION: ICD-10-CM

## 2024-02-21 DIAGNOSIS — O03.9 COMPLETE SPONTANEOUS ABORTION: Primary | ICD-10-CM

## 2024-02-21 LAB — B-HCG SERPL-ACNC: 4826 MIU/ML (ref 0–5)

## 2024-02-21 PROCEDURE — 36415 COLL VENOUS BLD VENIPUNCTURE: CPT

## 2024-02-21 PROCEDURE — 84702 CHORIONIC GONADOTROPIN TEST: CPT

## 2024-02-21 PROCEDURE — 99214 OFFICE O/P EST MOD 30 MIN: CPT

## 2024-02-21 NOTE — TELEPHONE ENCOUNTER
"Patient called back regarding voicemail received.  Patient got US in ED Thursday and found out she was pregnant.  Cristopher 10/05 by US.  Patient stated Monday she passed multiple clots and is still bleeding like a period.      Tried to get appoinment in office today and none were available.  Message sent high priority to office to assist with scheduling.      Patient's preferred number for call back is [phone].  Number on file is having issues.        Reason for Disposition   MODERATE vaginal bleeding (e.g., soaking 1 pad / hour; clots)    Answer Assessment - Initial Assessment Questions  1. ONSET: \"When did this bleeding start?\"        Thursday, 02/15 - light spotting  Monday - cramping/clots    2. DESCRIPTION: \"Describe the bleeding that you are having.\" \"How much bleeding is there?\"     - SPOTTING: spotting, or pinkish / brownish mucous discharge; does not fill panti-liner or pad     - MILD:  less than 1 pad / hour; less than patient's usual menstrual bleeding    - MODERATE: 1-2 pads / hour; 1 menstrual cup every 6 hours; small-medium blood clots (e.g., pea, grape, small coin)    - SEVERE: soaking 2 or more pads/hour for 2 or more hours; 1 menstrual cup every 2 hours; bleeding not contained by pads or continuous red blood from vagina; large blood clots (e.g., golf ball, large coin)       Moderate    3. ABDOMINAL PAIN SEVERITY: If present, ask: \"How bad is it?\"  (e.g., Scale 1-10; mild, moderate, or severe)    - MILD (1-3): doesn't interfere with normal activities, abdomen soft and not tender to touch     - MODERATE (4-7): interferes with normal activities or awakens from sleep, tender to touch     - SEVERE (8-10): excruciating pain, doubled over, unable to do any normal activities      Mild    4. PREGNANCY: \"Do you know how many weeks or months pregnant you are?\" \"When was the first day of your last normal menstrual period?\"      LMP 12/22, CRISTOPHER by ultrasound 10/05    5. HEMODYNAMIC STATUS: \"Are you weak or " "feeling lightheaded?\" If Yes, ask: \"Can you stand and walk normally?\"       No    6. OTHER SYMPTOMS: \"What other symptoms are you having with the bleeding?\" (e.g., passed tissue, vaginal discharge, fever, menstrual-type cramps)      Passed clots on Monday    Protocols used: Pregnancy - Vaginal Bleeding Less Than 20 Weeks EGA-ADULT-[place]    "

## 2024-02-21 NOTE — PROGRESS NOTES
Assessment/Plan:  -TVUS reveals empty uterus  -Quantitative HCG ordered  -Blood type O+  -Patient to continue to monitor bleeding  -Discussed contraception counseling  -Patient to call for concerns or if she develops fevers, chills, severe abdominal pain  -RTO for annual or sooner for contraception visit     - Discussed contraceptive options including COCP, Patch, Nuvaring, Depo provera injection, Nexplanon and IUD. Patient considering Mirena IUD, given brochure for this. Will wait for HCG levels to return before scheduling.   - Discussed usage, insertion processes and SE profiles of each method, including risks and benefits      Diagnoses and all orders for this visit:    Complete spontaneous   -     hCG, quantitative; Standing    Counseling for birth control regarding intrauterine device (IUD)        Subjective:      Patient ID: Cece Butler is a 23 y.o. female.    Patient is a 22 y/o  F presenting to the office today with heavy vaginal bleeding and pelvic cramping. She was seen in the ED on 2/15 for nausea/vomiting and decreased appetite. TVUS was done at that time and revealed ~6 week fetus with positive cardiac activity. Patient began spotting on  and then began with heavier bleeding and passing clots later that evening. She had significant pelvic cramping at that time. Bleeding with clots was present for a majority of the day yesterday  and patient still notes bleeding today. Cramping has improved today but she has been feeling dizzy and remains with decreased appetite. Today, on TVUS, uterus was empty, products of conception have passed.     TVUS FINDINGS 2/15/2024:  A single live intrauterine gestation is identified.  Cardiac activity is present. Heart rate of 136 bpm.     YOLK SAC: Present and normal in size and appearance.  MEAN GESTATIONAL SAC SIZE: 16.0 mm =  5w 6d  MEAN CROWN RUMP LENGTH: 7.0 mm = 6w 4d  FETAL ANATOMY: Appropriate for gestational age.  AMNIOTIC FLUID/SAC  "SHAPE: Within expected normal range.  PLACENTA: The placenta can not be adequately assessed at this early gestational age.     There is no significant subchorionic fluid collection.     UTERUS/ADNEXA:  The uterus and ovaries are within normal limits.  The cervix remains closed.  No free fluid present.     IMPRESSION:     Single live intrauterine gestation at 6 weeks 4 days by crown-rump length (range +/- 4 days).        The following portions of the patient's history were reviewed and updated as appropriate: allergies, current medications, past family history, past medical history, past social history, past surgical history, and problem list.    Review of Systems   Constitutional:  Positive for appetite change (very poor appetite recently). Negative for chills and fever.   Gastrointestinal:  Positive for nausea and vomiting.   Genitourinary:  Positive for pelvic pain (midline cramping) and vaginal bleeding (heavy vaginal bleeding x 2 days, passed large clots yesterday).   Neurological:  Positive for dizziness.       Objective:    /70 (BP Location: Left arm, Patient Position: Sitting, Cuff Size: Standard)   Ht 5' 1\" (1.549 m)   Wt 49.4 kg (109 lb)   LMP 12/26/2023 (Approximate) Comment: had period entire month of December  BMI 20.60 kg/m²      Physical Exam  Vitals and nursing note reviewed.   Constitutional:       Appearance: Normal appearance.   HENT:      Head: Normocephalic and atraumatic.   Genitourinary:     Exam position: Lithotomy position.      Vagina: Bleeding present.      Comments: TVUS reveals empty uterus, vaginal bleeding present.   Neurological:      General: No focal deficit present.      Mental Status: She is alert and oriented to person, place, and time.   Psychiatric:         Mood and Affect: Mood normal.         Behavior: Behavior normal.       Ultrasound Probe Disinfection    A transvaginal ultrasound was performed.   Prior to use, disinfection was performed with High Level " Disinfection Process (Trophon)  Probe serial number [id number]: [id number] was used    Erma Tellez PA-C  02/21/24  2:21 PM     I have spent a total time of 30 minutes on 02/21/24 in caring for this patient including Diagnostic results, Prognosis, Risks and benefits of tx options, Instructions for management, Patient and family education, Counseling / Coordination of care, Documenting in the medical record, Reviewing / ordering tests, medicine, procedures  , and Obtaining or reviewing history  .

## 2024-03-06 ENCOUNTER — APPOINTMENT (OUTPATIENT)
Dept: LAB | Facility: AMBULARY SURGERY CENTER | Age: 24
End: 2024-03-06
Payer: COMMERCIAL

## 2024-03-06 DIAGNOSIS — O03.9 COMPLETE SPONTANEOUS ABORTION: ICD-10-CM

## 2024-03-06 LAB — B-HCG SERPL-ACNC: 25 MIU/ML (ref 0–5)

## 2024-03-06 PROCEDURE — 84702 CHORIONIC GONADOTROPIN TEST: CPT

## 2024-03-06 PROCEDURE — 36415 COLL VENOUS BLD VENIPUNCTURE: CPT

## 2024-04-10 ENCOUNTER — OFFICE VISIT (OUTPATIENT)
Dept: OBGYN CLINIC | Facility: CLINIC | Age: 24
End: 2024-04-10
Payer: COMMERCIAL

## 2024-04-10 VITALS
WEIGHT: 112.4 LBS | DIASTOLIC BLOOD PRESSURE: 62 MMHG | HEIGHT: 61 IN | BODY MASS INDEX: 21.22 KG/M2 | SYSTOLIC BLOOD PRESSURE: 114 MMHG

## 2024-04-10 DIAGNOSIS — Z11.3 SCREENING FOR STDS (SEXUALLY TRANSMITTED DISEASES): ICD-10-CM

## 2024-04-10 DIAGNOSIS — R35.0 URINE FREQUENCY: ICD-10-CM

## 2024-04-10 DIAGNOSIS — O03.9 MISCARRIAGE: Primary | ICD-10-CM

## 2024-04-10 DIAGNOSIS — D68.00 VON WILLEBRAND DISEASE (HCC): ICD-10-CM

## 2024-04-10 DIAGNOSIS — N94.9 VAGINAL BURNING: ICD-10-CM

## 2024-04-10 DIAGNOSIS — R10.2 PELVIC PAIN: ICD-10-CM

## 2024-04-10 PROBLEM — Z3A.01 LESS THAN 8 WEEKS GESTATION OF PREGNANCY: Status: RESOLVED | Noted: 2024-02-15 | Resolved: 2024-04-10

## 2024-04-10 PROBLEM — O21.9 NAUSEA AND VOMITING DURING PREGNANCY PRIOR TO 22 WEEKS GESTATION: Status: RESOLVED | Noted: 2024-02-15 | Resolved: 2024-04-10

## 2024-04-10 PROCEDURE — 87086 URINE CULTURE/COLONY COUNT: CPT | Performed by: PHYSICIAN ASSISTANT

## 2024-04-10 PROCEDURE — 87591 N.GONORRHOEAE DNA AMP PROB: CPT | Performed by: PHYSICIAN ASSISTANT

## 2024-04-10 PROCEDURE — 87510 GARDNER VAG DNA DIR PROBE: CPT | Performed by: PHYSICIAN ASSISTANT

## 2024-04-10 PROCEDURE — 87491 CHLMYD TRACH DNA AMP PROBE: CPT | Performed by: PHYSICIAN ASSISTANT

## 2024-04-10 PROCEDURE — 87480 CANDIDA DNA DIR PROBE: CPT | Performed by: PHYSICIAN ASSISTANT

## 2024-04-10 PROCEDURE — 99214 OFFICE O/P EST MOD 30 MIN: CPT | Performed by: PHYSICIAN ASSISTANT

## 2024-04-10 PROCEDURE — 87660 TRICHOMONAS VAGIN DIR PROBE: CPT | Performed by: PHYSICIAN ASSISTANT

## 2024-04-10 NOTE — PROGRESS NOTES
Assessment/Plan:       Diagnoses and all orders for this visit:    Miscarriage  -     US pelvis complete w transvaginal; Future  -     hCG, quantitative; Future    Pelvic pain  -     US pelvis complete w transvaginal; Future  -     hCG, quantitative; Future  -     VAGINOSIS DNA PROBE  -     Chlamydia/GC amplified DNA by PCR    Urine frequency  -     Urine culture    Vaginal burning  -     VAGINOSIS DNA PROBE    Von Willebrand disease (HCC)    Screening for STDs (sexually transmitted diseases)  -     Chlamydia/GC amplified DNA by PCR     23-year-old female new patient 3 days of vaginal bleeding/mucousy blood-tinged discharge and pelvic pain.  Von Willebrand  History of Depo-Provera use restarted in January with previous normal hCG quant and UPT prior.  History of miscarriage in February, spontaneous, with follow-up at [facility] empty uterus on TVUS, most recent quantitative hCG less than 25 last month.  Bleeding and cramping had subsided for a few weeks and returned 3 days ago.  Patient also admits to some urinary frequency, vaginal burning, dyspareunia.  Examination today reveals some vaginal and cervical discomfort on speculum exam with minimal generalized mild erythema but no concerning lesion, active bleeding, abnormal odor or visible abnormal discharge.  She did have tenderness primarily in the central and left side of external pelvis to palpation as well as bimanual exam.    Plan:  Obtain follow-up pelvic ultrasound in setting of return of pelvic pain  Will repeat hCG quant  Rule out infection with GC/CT and vaginosis probe -we will call with results and treat accordingly.  Clean-catch urine sample obtained for urine culture in setting of urinary frequency/pelvic pain    Patient can take Tylenol use heating pad for pain control if needed.  Reassurance given no significant active bleeding appreciated at this time.  She is interested in restarting birth control but does not want Depo-Provera at this time.  Hx of menorrhagia due to the von willebrand.   She is interested in restarting OCPs that she was on a few years ago prior to her pregnancy and tolerated well.  No obvious contraindication to restart ANJEL.  Will prescribe once we receive test results.  Patient advised to practice complete abstinence until that time.  Patient expresses understanding and agreeable to plan.  She was advised to call the office with any worsening symptoms or go to ED if she is soaking through pad in less than an hour or if pain substantially worsens.    Pt desires to continue her care at [place] office location - will reschedule f/u and annual exam in July to this office.     Chief Complaint   Patient presents with    Dyspareunia     Pt is here for pain with intercourse. Pt states she found out she was pregnant after administration of depo she had miscarriage 3/18 and has not had a period since and has been having really bad pelvic pain. Pt states she is currently spotting but unsure if it is her period or discharge.        Subjective:      Patient ID: Cece Butler is a 23 y.o. female.    23-year-old female w/ known Von Willebrand, hx of heavy periods previously on Depo in [place], presenting as a new patient at our office for f/u to spontaneous miscarriage in February, concern of restart of some vaginal bleeding, mucousy bloody discharge and cramping that had stopped s/p miscarriage 2-3 weeks ago.   Former pt at [facility].    Pt was restarted on depo provera 1/23/24, previously used it in [place] due to heavy periods and Von Willebrand.  HCG < 1 1/10/24.  UPT neg 1/23 and depo administered  Patient seen 2/15 in ED for symptoms revealing positive pregnancy test, IUP on TVUS.  She had initial prenatal visit on 2/21/24 at [facility] and reported heavy vaginal bleeding and cramping at the time with TVUS revealing empty uterus.  O+ blood type  Miscarriage    Pt states bleeding and Cramping stopped about 2-3 weeks ago.  However, bleeding and  "cramping restarted 3 days ago.  She reports 2 days of normal bleeding. Now today 1 day of clear mucousy discharge with blood tinged.  Today she has mild cramping, left side> right.  She is not doing anything for pain.  Denies fever, N/V or diarrhea.    She also notices pain when she had intercourse 6 days ago.   She did have some spotting after intercourse, lasting only a few hours.     She denies any vaginal itching, but sometimes area stings or burns.  She admits to urinary frequency, no dysuria or urgency.  No known odor.     Hx 1 prior pregnancy 2022 - csection      The following portions of the patient's history were reviewed and updated as appropriate: allergies, current medications, past family history, past medical history, past social history, past surgical history, and problem list.    Review of Systems   Constitutional: Negative.  Negative for activity change, appetite change, chills, fatigue and fever.   Respiratory: Negative.     Cardiovascular: Negative.    Gastrointestinal:  Negative for abdominal pain, diarrhea, nausea and vomiting.   Genitourinary:  Positive for dyspareunia, frequency, menstrual problem, pelvic pain and vaginal bleeding. Negative for dysuria, flank pain, hematuria, urgency, vaginal discharge and vaginal pain.   Neurological: Negative.          Objective:      /62 (BP Location: Left arm, Patient Position: Sitting, Cuff Size: Adult)   Ht 5' 1\" (1.549 m)   Wt 51 kg (112 lb 6.4 oz)   Breastfeeding Unknown   BMI 21.24 kg/m²          Physical Exam  Vitals reviewed.   Constitutional:       Appearance: Normal appearance. She is not ill-appearing.   Cardiovascular:      Rate and Rhythm: Normal rate and regular rhythm.      Heart sounds: Normal heart sounds.   Pulmonary:      Effort: Pulmonary effort is normal.      Breath sounds: Normal breath sounds and air entry.   Abdominal:      Palpations: Abdomen is soft.      Tenderness: There is no abdominal tenderness.   Genitourinary:     " Labia:         Right: No rash, tenderness or lesion.         Left: No rash, tenderness or lesion.       Vagina: Normal. No vaginal discharge, erythema, tenderness, bleeding or lesions.      Cervix: Cervical motion tenderness present. No discharge, friability, lesion, erythema or cervical bleeding.      Uterus: Tender.       Adnexa:         Right: No tenderness.          Left: Tenderness present.    Musculoskeletal:      Cervical back: Neck supple.   Lymphadenopathy:      Lower Body: No right inguinal adenopathy. No left inguinal adenopathy.   Neurological:      Mental Status: She is alert and oriented to person, place, and time.   Psychiatric:         Mood and Affect: Mood normal.         Behavior: Behavior normal. Behavior is cooperative.

## 2024-04-11 ENCOUNTER — APPOINTMENT (OUTPATIENT)
Dept: LAB | Facility: AMBULARY SURGERY CENTER | Age: 24
End: 2024-04-11
Payer: COMMERCIAL

## 2024-04-11 ENCOUNTER — HOSPITAL ENCOUNTER (OUTPATIENT)
Dept: ULTRASOUND IMAGING | Facility: HOSPITAL | Age: 24
Discharge: HOME | End: 2024-04-11
Payer: COMMERCIAL

## 2024-04-11 DIAGNOSIS — O03.9 MISCARRIAGE: ICD-10-CM

## 2024-04-11 DIAGNOSIS — R10.2 PELVIC PAIN: ICD-10-CM

## 2024-04-11 LAB
B-HCG SERPL-ACNC: 1 MIU/ML (ref 0–5)
BACTERIA UR CULT: NORMAL
C TRACH DNA SPEC QL NAA+PROBE: NEGATIVE
CANDIDA RRNA VAG QL PROBE: NOT DETECTED
G VAGINALIS RRNA GENITAL QL PROBE: DETECTED
N GONORRHOEA DNA SPEC QL NAA+PROBE: NEGATIVE
T VAGINALIS RRNA GENITAL QL PROBE: NOT DETECTED

## 2024-04-11 PROCEDURE — 76830 TRANSVAGINAL US NON-OB: CPT

## 2024-04-11 PROCEDURE — 76856 US EXAM PELVIC COMPLETE: CPT

## 2024-04-11 PROCEDURE — 36415 COLL VENOUS BLD VENIPUNCTURE: CPT

## 2024-04-11 PROCEDURE — 84702 CHORIONIC GONADOTROPIN TEST: CPT

## 2024-04-12 DIAGNOSIS — N76.0 BV (BACTERIAL VAGINOSIS): Primary | ICD-10-CM

## 2024-04-12 DIAGNOSIS — B96.89 BV (BACTERIAL VAGINOSIS): Primary | ICD-10-CM

## 2024-04-12 RX ORDER — METRONIDAZOLE 500 MG/1
500 TABLET ORAL EVERY 12 HOURS SCHEDULED
Qty: 14 TABLET | Refills: 0 | Status: SHIPPED | OUTPATIENT
Start: 2024-04-12 | End: 2024-04-19

## 2024-04-16 ENCOUNTER — OFFICE VISIT (OUTPATIENT)
Dept: OBGYN CLINIC | Facility: CLINIC | Age: 24
End: 2024-04-16
Payer: COMMERCIAL

## 2024-04-16 VITALS
SYSTOLIC BLOOD PRESSURE: 92 MMHG | DIASTOLIC BLOOD PRESSURE: 62 MMHG | WEIGHT: 113 LBS | BODY MASS INDEX: 21.34 KG/M2 | HEIGHT: 61 IN

## 2024-04-16 DIAGNOSIS — Z30.011 ENCOUNTER FOR INITIAL PRESCRIPTION OF CONTRACEPTIVE PILLS: Primary | ICD-10-CM

## 2024-04-16 PROCEDURE — 99214 OFFICE O/P EST MOD 30 MIN: CPT | Performed by: OBSTETRICS & GYNECOLOGY

## 2024-04-16 RX ORDER — NORGESTIMATE AND ETHINYL ESTRADIOL 0.25-0.035
1 KIT ORAL DAILY
Qty: 84 TABLET | Refills: 4 | Status: SHIPPED | OUTPATIENT
Start: 2024-04-16

## 2024-04-16 NOTE — PROGRESS NOTES
Assessment/Plan:         Diagnoses and all orders for this visit:    Encounter for initial prescription of contraceptive pills  -     norgestimate-ethinyl estradiol (Sprintec 28) 0.25-35 MG-MCG per tablet; Take 1 tablet by mouth daily          Subjective:      Patient ID: Cece Butler is a 23 y.o. female.    Patient is a 23-year-old  2 para 1-0-1-1 who presents following a miscarriage in February and no normal menses but 1 short episode of bleeding recently.  She denies fever, chills, heavy bleeding, or pelvic pain.  Her hCG has returned to less than 1.  Ultrasound, some thickening of the uterine lining was noted but she has not really had a period since the spontaneous miscarriage.  She would like to begin on birth control.  After discussion of options risk and benefits, she will begin on Sprintec which she is taken for years since she was a teenager.  She has been advised of symptoms of either an endometrial polyp or retained products of conception which are less likely because of the negative hCG level.  She will return as needed or in 1 year.        The following portions of the patient's history were reviewed and updated as appropriate: allergies, current medications, past family history, past medical history, past social history, past surgical history, and problem list.    Review of Systems   Constitutional:  Negative for chills, diaphoresis, fatigue, fever and unexpected weight change.   HENT:  Negative for congestion, sinus pressure, sinus pain, tinnitus and trouble swallowing.    Eyes:  Negative for visual disturbance.   Respiratory:  Negative for cough, chest tightness and shortness of breath.    Cardiovascular:  Negative for chest pain, palpitations and leg swelling.   Gastrointestinal:  Negative for abdominal distention, abdominal pain, anal bleeding, constipation, diarrhea, nausea, rectal pain and vomiting.   Endocrine: Negative for heat intolerance.   Genitourinary:  Negative for difficulty  "urinating, dysuria, flank pain, frequency, genital sores, hematuria and urgency.   Musculoskeletal:  Negative for arthralgias, back pain and joint swelling.   Skin:  Negative for rash.   Allergic/Immunologic: Negative for environmental allergies and food allergies.   Neurological:  Negative for headaches.   Hematological:  Negative for adenopathy. Does not bruise/bleed easily.   Psychiatric/Behavioral:  Negative for decreased concentration and dysphoric mood. The patient is not nervous/anxious.          Objective:      BP 92/62 (BP Location: Left arm, Patient Position: Sitting, Cuff Size: Standard)   Ht 5' 1\" (1.549 m)   Wt 51.3 kg (113 lb)   Breastfeeding No   BMI 21.35 kg/m²          Physical Exam  Vitals and nursing note reviewed. Exam conducted with a chaperone present.   Constitutional:       Appearance: Normal appearance. She is normal weight.   HENT:      Head: Normocephalic and atraumatic.      Nose: Nose normal.   Eyes:      Conjunctiva/sclera: Conjunctivae normal.   Pulmonary:      Effort: Pulmonary effort is normal.   Abdominal:      General: Abdomen is flat.      Palpations: Abdomen is soft.   Musculoskeletal:         General: Normal range of motion.   Skin:     General: Skin is warm and dry.   Neurological:      General: No focal deficit present.      Mental Status: She is alert. Mental status is at baseline.   Psychiatric:         Mood and Affect: Mood normal.         Behavior: Behavior normal.         Thought Content: Thought content normal.         Judgment: Judgment normal.           "

## 2024-04-30 ENCOUNTER — OFFICE VISIT (OUTPATIENT)
Dept: FAMILY MEDICINE CLINIC | Facility: CLINIC | Age: 24
End: 2024-04-30
Payer: COMMERCIAL

## 2024-04-30 VITALS
RESPIRATION RATE: 16 BRPM | WEIGHT: 110.4 LBS | HEIGHT: 61 IN | HEART RATE: 78 BPM | BODY MASS INDEX: 20.84 KG/M2 | SYSTOLIC BLOOD PRESSURE: 100 MMHG | DIASTOLIC BLOOD PRESSURE: 60 MMHG | OXYGEN SATURATION: 100 %

## 2024-04-30 DIAGNOSIS — H61.21 IMPACTED CERUMEN OF RIGHT EAR: Primary | ICD-10-CM

## 2024-04-30 DIAGNOSIS — J31.0 RHINITIS MEDICAMENTOSA: ICD-10-CM

## 2024-04-30 DIAGNOSIS — T48.5X5A RHINITIS MEDICAMENTOSA: ICD-10-CM

## 2024-04-30 DIAGNOSIS — H92.01 RIGHT EAR PAIN: ICD-10-CM

## 2024-04-30 PROCEDURE — 69210 REMOVE IMPACTED EAR WAX UNI: CPT | Performed by: FAMILY MEDICINE

## 2024-04-30 PROCEDURE — 99213 OFFICE O/P EST LOW 20 MIN: CPT | Performed by: FAMILY MEDICINE

## 2024-04-30 NOTE — PROGRESS NOTES
"Name: Cece Butler      : 2000      MRN: [id number]  Encounter Provider: Alondra Peralta DO  Encounter Date: 2024   Encounter department: [facility]    Assessment & Plan     1. Impacted cerumen of right ear  Comments:  removed with flush/curette for nml canal and drug as per PE    2. Right ear pain  Comments:  due to congestion vs cerumen impaction  use daily nasal steroid (floase or flonase sensimyst)    3. Rhinitis medicamentosa  Comments:  discussed need to stop afrin and start daily nasal steroid.  discussed proper use of nasal sprays  start fluticasone           Subjective      HPI  Pt c/o R ear pain and popping for two weeks.  +decreased hearing.  No d/c.    +chronic congestion.  Has felt nasal strays never work for her, so she uses afrin 8-10x daily.    Review of Systems  See hpi    Current Outpatient Medications on File Prior to Visit   Medication Sig    norgestimate-ethinyl estradiol (Sprintec 28) 0.25-35 MG-MCG per tablet Take 1 tablet by mouth daily       Objective     /60   Pulse 78   Resp 16   Ht 5' 1\" (1.549 m)   Wt 50.1 kg (110 lb 6.4 oz)   SpO2 100%   BMI 20.86 kg/m²     Physical Exam  Constitutional:       Appearance: Normal appearance.   HENT:      Head: Normocephalic and atraumatic.      Right Ear: External ear normal. There is impacted cerumen.      Left Ear: Tympanic membrane, ear canal and external ear normal.      Ears:      Comments: S/p cerumen impaction removal, TMI, +air-fluid levels.  No erythema or bulging     Nose: Congestion present.      Mouth/Throat:      Mouth: Mucous membranes are moist.      Pharynx: No oropharyngeal exudate.   Eyes:      Extraocular Movements: Extraocular movements intact.      Conjunctiva/sclera: Conjunctivae normal.      Pupils: Pupils are equal, round, and reactive to light.   Cardiovascular:      Rate and Rhythm: Normal rate and regular rhythm.      Heart sounds: No murmur heard.     No friction rub. No " gallop.   Pulmonary:      Effort: Pulmonary effort is normal.      Breath sounds: Normal breath sounds. No wheezing, rhonchi or rales.   Lymphadenopathy:      Cervical: No cervical adenopathy.   Neurological:      Mental Status: She is alert.     Ear cerumen removal    Date/Time: 4/30/2024 3:00 PM    Performed by: Alondra Peralta DO  Authorized by: Alondra Peralta DO  Universal Protocol:  Consent: Verbal consent obtained.  Risks and benefits: risks, benefits and alternatives were discussed  Consent given by: patient  Patient understanding: patient states understanding of the procedure being performed    Patient location:  Clinic  Procedure details:     Local anesthetic:  None    Location:  R ear    Procedure type: irrigation with instrumentation      Instrumentation: curette      Approach:  External  Post-procedure details:     Complication:  None    Hearing quality:  Improved    Patient tolerance of procedure:  Tolerated well, no immediate complications      Alondra Peralta DO

## 2024-04-30 NOTE — PATIENT INSTRUCTIONS
Flonase sensamist  No afrin  If your ears don't get better with this in 2 weeks, let me know  Tylenol 3 tabs three times daily (not over 1000mg in a day)

## 2024-05-16 ENCOUNTER — TELEPHONE (OUTPATIENT)
Age: 24
End: 2024-05-16

## 2024-05-16 DIAGNOSIS — H92.01 RIGHT EAR PAIN: Primary | ICD-10-CM

## 2024-05-16 DIAGNOSIS — T48.5X5A RHINITIS MEDICAMENTOSA: ICD-10-CM

## 2024-05-16 DIAGNOSIS — J31.0 RHINITIS MEDICAMENTOSA: ICD-10-CM

## 2024-05-16 NOTE — TELEPHONE ENCOUNTER
Pt was in on 4/30 for ear wax removal. Now her ear is clogged again, same ear. She wants to know if she should come back in to see the doctor or go to ENT. Please, advise.

## 2024-06-05 ENCOUNTER — TELEPHONE (OUTPATIENT)
Age: 24
End: 2024-06-05

## 2024-06-05 DIAGNOSIS — Z30.42 ENCOUNTER FOR MANAGEMENT AND INJECTION OF DEPO-PROVERA: ICD-10-CM

## 2024-06-05 DIAGNOSIS — Z30.09 COUNSELING FOR BIRTH CONTROL REGARDING INTRAUTERINE DEVICE (IUD): Primary | ICD-10-CM

## 2024-06-05 RX ORDER — MEDROXYPROGESTERONE ACETATE 150 MG/ML
150 INJECTION, SUSPENSION INTRAMUSCULAR
Qty: 1 ML | Refills: 3 | Status: SHIPPED | OUTPATIENT
Start: 2024-06-05 | End: 2024-09-03

## 2024-06-05 NOTE — TELEPHONE ENCOUNTER
Patient called the RX Refill Line. Message is being forwarded to the office.     Patient is requesting a refill on her Depo Injection. The medication is not on her current medication list. Patient needs this sent to he pharmacy today to bring to Office visit that she has tomorrow. Please review and send a script to patients pharmacy  [facility]/pharmacy #[id number] - [place] - [address]     Please contact patient at [phone]

## 2024-06-05 NOTE — TELEPHONE ENCOUNTER
Pt called Pharmacy to have depo filled for upcoming appt tomorrow, however, pharmacy communicated depo would need to be authorized in order to be filled.

## 2024-06-06 ENCOUNTER — OFFICE VISIT (OUTPATIENT)
Dept: OBGYN CLINIC | Facility: CLINIC | Age: 24
End: 2024-06-06
Payer: COMMERCIAL

## 2024-06-06 VITALS
DIASTOLIC BLOOD PRESSURE: 58 MMHG | BODY MASS INDEX: 19.63 KG/M2 | HEIGHT: 61 IN | WEIGHT: 104 LBS | SYSTOLIC BLOOD PRESSURE: 100 MMHG

## 2024-06-06 DIAGNOSIS — Z30.42 ENCOUNTER FOR SURVEILLANCE OF INJECTABLE CONTRACEPTIVE: Primary | ICD-10-CM

## 2024-06-06 LAB — SL AMB POCT URINE HCG: NEGATIVE

## 2024-06-06 PROCEDURE — 99213 OFFICE O/P EST LOW 20 MIN: CPT | Performed by: PHYSICIAN ASSISTANT

## 2024-06-06 PROCEDURE — 81025 URINE PREGNANCY TEST: CPT | Performed by: PHYSICIAN ASSISTANT

## 2024-06-06 PROCEDURE — 96372 THER/PROPH/DIAG INJ SC/IM: CPT | Performed by: PHYSICIAN ASSISTANT

## 2024-06-06 RX ORDER — MEDROXYPROGESTERONE ACETATE 150 MG/ML
150 INJECTION, SUSPENSION INTRAMUSCULAR ONCE
Status: COMPLETED | OUTPATIENT
Start: 2024-06-06 | End: 2024-06-06

## 2024-06-06 RX ADMIN — MEDROXYPROGESTERONE ACETATE 150 MG: 150 INJECTION, SUSPENSION INTRAMUSCULAR at 14:39

## 2024-06-06 NOTE — PROGRESS NOTES
Assessment/Plan:   - UPT negative in our office today  - DEPO provera administered in L. Deltoid. Patient tolerated well  - Reviewed need for backup protection with condoms for 4 weeks following depo administration  - Discussed option for IUD in future, would recommend kyleena/mirena  - Will monitor bleeding. Patient may discontinue OCP. Reviewed irregular bleeding common following a miscarriage and may take 3-4 months for cycles to regulate  - Discussed unlikely retained POC on US given negative pregnancy tests and empty uterus in February following SAB  - Patient declines f/u US at this time  - Patient to reach out if bleeding does not improve with depo     Diagnoses and all orders for this visit:    Encounter for surveillance of injectable contraceptive  -     POCT urine HCG          Subjective:     Patient ID: Cece Butler is a 24 y.o. female.    Cece is a 23YO  WF presenting to the office to discuss birth control and depo-provera.   Patient is currently taking sprintec and is here today for her DEPO injection. Patient had a miscarriage in February. Her HCG was trended down and TVUS revealed an empty uterus at her office visit. She states her bleeding post miscarriage stopped completely and then she got a period 3 weeks later. Since then, she has been bleeding. Patient was seen in another office and sent for a vaginal ultrasound in April, 2 months after her miscarriage. It showed some vascularity within the uterus and patient was told she may have retained products of conception, despite negative HCG testing. She is confused regarding these results and recommendations and would like to discuss further. She would like to proceed with depo injection as her UPT is negative today.        Review of Systems   Respiratory:  Negative for shortness of breath.    Cardiovascular:  Negative for chest pain.   Genitourinary:  Positive for menstrual problem and vaginal bleeding. Negative for pelvic pain.          Objective:     Physical Exam  Vitals reviewed.   Constitutional:       General: She is not in acute distress.     Appearance: Normal appearance. She is normal weight. She is not ill-appearing, toxic-appearing or diaphoretic.   HENT:      Head: Normocephalic and atraumatic.   Pulmonary:      Effort: Pulmonary effort is normal.   Skin:     General: Skin is warm and dry.   Neurological:      General: No focal deficit present.      Mental Status: She is alert.   Psychiatric:         Mood and Affect: Mood normal.         Behavior: Behavior normal.           I have spent a total time of 20 minutes on 06/06/24 in caring for this patient including Patient and family education, Counseling / Coordination of care, Documenting in the medical record, Reviewing / ordering tests, medicine, procedures  , and Obtaining or reviewing history  .

## 2024-06-06 NOTE — PROGRESS NOTES
Date last pap: N/A.  Last Depo-Provera: 1/23/24.  Side Effects if any: N/A.  Serum HCG indicated? Yes - negative.  Depo-Provera 150 mg IM given by: Roxana Snyder.  Next appointment due between 8/22/24 - 9/5/24.    NDC#: 32192-496-06  LOT#:  [id number]  EXP: 1/31/26

## 2024-06-10 ENCOUNTER — HOSPITAL ENCOUNTER (EMERGENCY)
Facility: HOSPITAL | Age: 24
Discharge: HOME | End: 2024-06-10
Attending: EMERGENCY MEDICINE
Payer: COMMERCIAL

## 2024-06-10 VITALS
RESPIRATION RATE: 18 BRPM | HEART RATE: 87 BPM | HEIGHT: 61 IN | BODY MASS INDEX: 20.39 KG/M2 | TEMPERATURE: 98.9 F | SYSTOLIC BLOOD PRESSURE: 121 MMHG | OXYGEN SATURATION: 96 % | DIASTOLIC BLOOD PRESSURE: 71 MMHG | WEIGHT: 108 LBS

## 2024-06-10 DIAGNOSIS — S61.419A HAND LACERATION: Primary | ICD-10-CM

## 2024-06-10 PROCEDURE — 99284 EMERGENCY DEPT VISIT MOD MDM: CPT | Performed by: EMERGENCY MEDICINE

## 2024-06-10 PROCEDURE — 99282 EMERGENCY DEPT VISIT SF MDM: CPT

## 2024-06-10 PROCEDURE — 12001 RPR S/N/AX/GEN/TRNK 2.5CM/<: CPT | Performed by: EMERGENCY MEDICINE

## 2024-06-10 RX ORDER — LIDOCAINE HYDROCHLORIDE AND EPINEPHRINE 10; 10 MG/ML; UG/ML
5 INJECTION, SOLUTION INFILTRATION; PERINEURAL ONCE
Status: COMPLETED | OUTPATIENT
Start: 2024-06-10 | End: 2024-06-10

## 2024-06-10 RX ADMIN — LIDOCAINE HYDROCHLORIDE,EPINEPHRINE BITARTRATE 5 ML: 10; .01 INJECTION, SOLUTION INFILTRATION; PERINEURAL at 11:03

## 2024-06-10 NOTE — DISCHARGE INSTRUCTIONS
Please have sutures removed in about 10 days by returning to the emergency department or going to your family doctor/urgent care.    Monitor for signs of infection.    Thank you for allowing us to take part in your care.

## 2024-06-10 NOTE — ED PROVIDER NOTES
History  Chief Complaint   Patient presents with    Hand Laceration     L hand lac. Patient was starting to prep breakfast knife slipped bleeding controlled. Pt has von willebrand disease  Last tetanus unknown      24-year-old female with history of von Willebrand's disease presenting due to laceration of left hand.  Patient states she was trying to separate frozen sausages for breakfast when the knife slipped and hit the webbing area in between her left thumb and index finger.  Last tetanus was in , bleeding currently controlled.  No other injuries or complaints at this time.        Prior to Admission Medications   Prescriptions Last Dose Informant Patient Reported? Taking?   medroxyPROGESTERone (DEPO-PROVERA) 150 mg/mL injection  Self No No   Sig: Inject 1 mL (150 mg total) into a muscle every 3 (three) months      Facility-Administered Medications: None       Past Medical History:   Diagnosis Date    Less than 8 weeks gestation of pregnancy 02/15/2024    Varicella     immunized    Von Willebrand disease (HCC)        Past Surgical History:   Procedure Laterality Date     SECTION  22    [place]  DELIVERY ONLY N/A 2022    Procedure:  SECTION ();  Surgeon: Didi Almazan MD;  Location: [facility] ;  Service: Obstetrics       Family History   Problem Relation Age of Onset    No Known Problems Mother     COPD Father     No Known Problems Sister     No Known Problems Maternal Grandmother     Supraventricular tachycardia Maternal Grandfather     No Known Problems Paternal Grandmother     Prostate cancer Paternal Grandfather     COPD Paternal Grandfather      I have reviewed and agree with the history as documented.    E-Cigarette/Vaping    E-Cigarette Use Former User      E-Cigarette/Vaping Substances    Nicotine No     THC No     CBD No     Flavoring No     Other No     Unknown No      Social History     Tobacco Use    Smoking status: Never    Smokeless tobacco: Never   Vaping  Use    Vaping status: Former   Substance Use Topics    Alcohol use: Never    Drug use: Not Currently     Types: Marijuana        Review of Systems   Constitutional:  Negative for chills and fever.   Skin:  Positive for wound.   Neurological:  Negative for dizziness, weakness, light-headedness and headaches.       Physical Exam  ED Triage Vitals [06/10/24 0942]   Temperature Pulse Respirations Blood Pressure SpO2   98.9 °F (37.2 °C) 87 18 121/71 96 %      Temp src Heart Rate Source Patient Position - Orthostatic VS BP Location FiO2 (%)   -- -- -- -- --      Pain Score       5             Orthostatic Vital Signs  Vitals:    06/10/24 0942   BP: 121/71   Pulse: 87       Physical Exam  Vitals and nursing note reviewed.   Constitutional:       General: She is not in acute distress.     Appearance: She is well-developed.   HENT:      Head: Normocephalic and atraumatic.      Nose: Nose normal. No congestion.   Eyes:      Extraocular Movements: Extraocular movements intact.      Conjunctiva/sclera: Conjunctivae normal.   Cardiovascular:      Rate and Rhythm: Normal rate and regular rhythm.      Pulses: Normal pulses.      Heart sounds: Normal heart sounds. No murmur heard.  Pulmonary:      Effort: Pulmonary effort is normal. No respiratory distress.      Breath sounds: Normal breath sounds.   Chest:      Chest wall: No tenderness.   Musculoskeletal:         General: Signs of injury present. No deformity. Normal range of motion.      Comments: Small 1 cm laceration in webbing near the thumb of left hand.  Bleeding currently controlled.  Full range of motion of fingers, normal sensation.  Normal cap refill.   Skin:     General: Skin is warm and dry.      Findings: Lesion present. No bruising or rash.   Neurological:      General: No focal deficit present.      Mental Status: She is alert.      Sensory: No sensory deficit.         ED Medications  Medications   lidocaine-epinephrine (XYLOCAINE/EPINEPHRINE) 1 %-1:100,000  injection 5 mL (5 mL Infiltration Given by Other 6/10/24 1103)       Diagnostic Studies  Results Reviewed       None                   No orders to display         Procedures  Universal Protocol:  Consent: Verbal consent obtained.  Consent given by: patient  Patient identity confirmed: verbally with patient  Laceration repair    Date/Time: 6/10/2024 4:12 PM    Performed by: Freddie Randle MD  Authorized by: Freddie Randle MD  Body area: upper extremity  Location details: left hand  Laceration length: 1 cm  Foreign bodies: no foreign bodies  Tendon involvement: none  Nerve involvement: none  Anesthesia: local infiltration    Anesthesia:  Local Anesthetic: lidocaine 1% with epinephrine  Anesthetic total: 2 mL    Wound Dehiscence:  Superficial Wound Dehiscence: simple closure      Procedure Details:  Irrigation solution: saline  Irrigation method: syringe  Amount of cleaning: standard  Debridement: none  Degree of undermining: none  Skin closure: Ethilon (5-0)  Number of sutures: 2  Technique: simple  Approximation: close  Approximation difficulty: simple  Dressing: non-adhesive packing strip and gauze roll  Patient tolerance: patient tolerated the procedure well with no immediate complications            ED Course  ED Course as of 06/10/24 1614   Mon Ludin 10, 2024   1052 24-year-old female presenting due to laceration of the left hand.  Von Willebrand's factor history but bleeding currently controlled.  Tetanus updated in 2021, no need to update currently.  Plan to repair with sutures and discharge.   1613 Patient tolerated sutures well, no active bleeding.  Discussed with patient she should have sutures removed in 10 days.  Monitor for signs of infection.  Patient is agreeable and appropriate for discharge at this time.                             SBIRT 22yo+      Flowsheet Row Most Recent Value   Initial Alcohol Screen: US AUDIT-C     1. How often do you have a drink containing alcohol? 0 Filed at: 06/10/2024 1030   2.  How many drinks containing alcohol do you have on a typical day you are drinking?  0 Filed at: 06/10/2024 1030   3a. Male UNDER 65: How often do you have five or more drinks on one occasion? 0 Filed at: 06/10/2024 1030   3b. FEMALE Any Age, or MALE 65+: How often do you have 4 or more drinks on one occassion? 0 Filed at: 06/10/2024 1030   Audit-C Score 0 Filed at: 06/10/2024 1030   ALICIA: How many times in the past year have you...    Used an illegal drug or used a prescription medication for non-medical reasons? Never Filed at: 06/10/2024 1030                  Medical Decision Making  Risk  Prescription drug management.          Disposition  Final diagnoses:   Hand laceration     Time reflects when diagnosis was documented in both MDM as applicable and the Disposition within this note       Time User Action Codes Description Comment    6/10/2024 11:30 AM Freddie Randle [S61.419A] Hand laceration           ED Disposition       ED Disposition   Discharge    Condition   Stable    Date/Time   Mon Ludin 10, 2024 1127    Comment   Cece Butler discharge to home/self care.                   Follow-up Information       Follow up With Specialties Details Why Contact Info Additional Information    Alondra Peralta,  Family Medicine   [address]  [address]  [place] [address]  [phone]       [facility] Emergency [facility]   [address] [address]  [phone] [facility], [facility] [place], [place], [address]            Discharge Medication List as of 6/10/2024 11:31 AM        CONTINUE these medications which have NOT CHANGED    Details   medroxyPROGESTERone (DEPO-PROVERA) 150 mg/mL injection Inject 1 mL (150 mg total) into a muscle every 3 (three) months, Starting Wed 6/5/2024, Until Tue 9/3/2024, Normal           No discharge procedures on file.    PDMP Review       None             ED  Provider  Attending physically available and evaluated Cece Butler. I managed the patient along with the ED Attending.    Electronically Signed by           Freddie Randle MD  06/10/24 3351

## 2024-06-10 NOTE — ED ATTENDING ATTESTATION
6/10/2024  INapoleon DO, saw and evaluated the patient. I have discussed the patient with the resident/non-physician practitioner and agree with the resident's/non-physician practitioner's findings, Plan of Care, and MDM as documented in the resident's/non-physician practitioner's note, except where noted. All available labs and Radiology studies were reviewed.  I was present for key portions of any procedure(s) performed by the resident/non-physician practitioner and I was immediately available to provide assistance.       At this point I agree with the current assessment done in the Emergency Department.  I have conducted an independent evaluation of this patient a history and physical is as follows: 24yoF, pmhx per resident note. Presenting with laceration. See resident note for details.     VSS. 1cm lac at webbing between 1st and 2nd digit lt hand. Hemostatic. Partially through dermis. No deeper structures involved.     24yoF with lac requiring repair. Removal timeline noted. Reviewed all findings both relevant and incidental with the patient at bedside. Pt verbalized understanding of findings, neccesary follow up, return to ED precautions. Pt agreed to review today's findings with their primary care provider. Pt non-toxic appearing upon discharge.       ED Course         Critical Care Time  Procedures

## 2024-06-21 ENCOUNTER — OFFICE VISIT (OUTPATIENT)
Dept: FAMILY MEDICINE CLINIC | Facility: CLINIC | Age: 24
End: 2024-06-21
Payer: COMMERCIAL

## 2024-06-21 VITALS
OXYGEN SATURATION: 98 % | HEART RATE: 76 BPM | RESPIRATION RATE: 16 BRPM | HEIGHT: 61 IN | SYSTOLIC BLOOD PRESSURE: 112 MMHG | BODY MASS INDEX: 19.63 KG/M2 | TEMPERATURE: 98.4 F | WEIGHT: 104 LBS | DIASTOLIC BLOOD PRESSURE: 62 MMHG

## 2024-06-21 DIAGNOSIS — S61.412D LACERATION OF LEFT HAND WITHOUT FOREIGN BODY, SUBSEQUENT ENCOUNTER: Primary | ICD-10-CM

## 2024-06-21 PROCEDURE — 99213 OFFICE O/P EST LOW 20 MIN: CPT | Performed by: FAMILY MEDICINE

## 2024-06-21 NOTE — PROGRESS NOTES
"Ambulatory Visit  Name: Cece Butler      : 2000      MRN: [id number]  Encounter Provider: Alondra Peralta DO  Encounter Date: 2024   Encounter department: [facility]    Assessment & Plan   1. Laceration of left hand without foreign body, subsequent encounter  Comments:  2 sutures removed.  pt tolerated well  call for any new sx       History of Present Illness     HPI  Pt presents for lac between 1st/2nd fingers L hand which occurred 11 days ago.  Went to ER and area was sutured.  Has healed well.  No redness or d/c    Review of Systems  See hpi      Objective     /62 (BP Location: Left arm, Patient Position: Sitting, Cuff Size: Standard)   Pulse 76   Temp 98.4 °F (36.9 °C) (Tympanic)   Resp 16   Ht 5' 1\" (1.549 m)   Wt 47.2 kg (104 lb)   SpO2 98%   BMI 19.65 kg/m²     Physical Exam  Gen: aaox3, NAD  L hand with 2 sutures intact in approx 7mm lac which appears well-healed.  Some opening at the top but this is superficial.      Administrative Statements       Suture removal    Date/Time: 2024 1:30 PM    Performed by: Alondra Peralta DO  Authorized by: Alondra Peralta DO  Universal Protocol:  Consent: Verbal consent obtained. Written consent not obtained.  Patient identity confirmed: verbally with patient      Patient location:  Clinic  Location:     Laterality:  Left    Location:  Upper extremity    Upper extremity location:  Hand    Hand location:  L hand  Procedure details:     Tools used:  Suture removal kit    Wound appearance:  No sign(s) of infection, good wound healing and clean    Number of sutures removed:  2  Post-procedure details:     Post-removal:  No dressing applied    Patient tolerance of procedure:  Tolerated well, no immediate complications        "

## 2024-07-09 ENCOUNTER — OFFICE VISIT (OUTPATIENT)
Dept: FAMILY MEDICINE CLINIC | Facility: CLINIC | Age: 24
End: 2024-07-09
Payer: COMMERCIAL

## 2024-07-09 VITALS
WEIGHT: 103.2 LBS | BODY MASS INDEX: 19.48 KG/M2 | HEIGHT: 61 IN | HEART RATE: 59 BPM | RESPIRATION RATE: 16 BRPM | SYSTOLIC BLOOD PRESSURE: 108 MMHG | TEMPERATURE: 98.9 F | DIASTOLIC BLOOD PRESSURE: 66 MMHG | OXYGEN SATURATION: 100 %

## 2024-07-09 DIAGNOSIS — S46.812A STRAIN OF LEFT TRAPEZIUS MUSCLE, INITIAL ENCOUNTER: ICD-10-CM

## 2024-07-09 DIAGNOSIS — M54.2 NECK PAIN: ICD-10-CM

## 2024-07-09 DIAGNOSIS — I88.9 CERVICAL LYMPHADENITIS: Primary | ICD-10-CM

## 2024-07-09 PROCEDURE — 99214 OFFICE O/P EST MOD 30 MIN: CPT | Performed by: FAMILY MEDICINE

## 2024-07-09 RX ORDER — AMOXICILLIN AND CLAVULANATE POTASSIUM 875; 125 MG/1; MG/1
1 TABLET, FILM COATED ORAL 2 TIMES DAILY
Qty: 20 TABLET | Refills: 0 | Status: SHIPPED | OUTPATIENT
Start: 2024-07-09 | End: 2024-07-19

## 2024-07-09 NOTE — PATIENT INSTRUCTIONS
You may use Motrin (Ibuprofen) up to 800mg 3 times daily with food (in 24 hours) as needed for pain or fever.    You may use Tylenol (Acetaminophen) up to 3,000mg daily (in 24 hours) as needed for pain or fever.

## 2024-07-09 NOTE — PROGRESS NOTES
Assessment/Plan:  Problem List Items Addressed This Visit    None  Visit Diagnoses       Cervical lymphadenitis    -  Primary    Relevant Medications    amoxicillin-clavulanate (AUGMENTIN) 875-125 mg per tablet    Start Augmentin BID x 10 days.  Advise warm compresses.        Neck pain        Relevant Orders    Ambulatory Referral to Physical Therapy    See above and below.        Strain of left trapezius muscle, initial encounter        Relevant Orders    Ambulatory Referral to Physical Therapy    Advise Motrin / Tylenol PRN, warm compresses.  Home Exercise Program given.  To PT PRN.                 Return if symptoms worsen or fail to improve.      No future appointments.     Subjective:     Cece is a 24 y.o. female who presents today for a follow-up on her acute medical conditions.        HPI:  Chief Complaint   Patient presents with   • Mass     Mass located on the left of her neck, patient noticed it a week ago. Patient states that it has gotten bigger within the week. Pain is associated with it. Denies injury. No redness, but tender to the touch and it hurts to more her head to the left. Denies fever. Patient rates pain 5/10.     -- Above per clinical staff and reviewed. --    HPI      Today:      Left posterior cervical ELISABETH - Symptoms x 1 week, increasing in size.  Worse c palpation and left cervical rotation.  No OTC meds.  Denies trauma.  Pain 5/10.      The following portions of the patient's history were reviewed and updated as appropriate: allergies, current medications, past family history, past medical history, past social history, past surgical history and problem list.      Review of Systems   Constitutional:  Positive for diaphoresis. Negative for appetite change, chills, fatigue and fever.   Respiratory:  Negative for chest tightness and shortness of breath.    Cardiovascular:  Negative for chest pain.   Gastrointestinal:  Positive for nausea (3 days ago ; neg home preg test neg home preg test)  "and vomiting (3 days ago). Negative for abdominal pain, blood in stool and diarrhea.   Genitourinary:  Negative for dysuria.   Musculoskeletal:  Positive for neck pain.        Current Outpatient Medications   Medication Sig Dispense Refill   • amoxicillin-clavulanate (AUGMENTIN) 875-125 mg per tablet Take 1 tablet by mouth 2 (two) times a day for 10 days 20 tablet 0   • medroxyPROGESTERone (DEPO-PROVERA) 150 mg/mL injection Inject 1 mL (150 mg total) into a muscle every 3 (three) months 1 mL 3     No current facility-administered medications for this visit.       Objective:  /66 (BP Location: Left arm, Patient Position: Sitting, Cuff Size: Standard)   Pulse 59   Temp 98.9 °F (37.2 °C)   Resp 16   Ht 5' 1\" (1.549 m)   Wt 46.8 kg (103 lb 3.2 oz)   SpO2 100%   BMI 19.50 kg/m²    Wt Readings from Last 3 Encounters:   07/09/24 46.8 kg (103 lb 3.2 oz)   06/21/24 47.2 kg (104 lb)   06/10/24 49 kg (108 lb)      BP Readings from Last 3 Encounters:   07/09/24 108/66   06/21/24 112/62   06/10/24 121/71          Physical Exam  Vitals and nursing note reviewed.   Constitutional:       Appearance: Normal appearance. She is well-developed and normal weight.   HENT:      Head: Normocephalic and atraumatic.      Right Ear: Tympanic membrane, ear canal and external ear normal.      Left Ear: Tympanic membrane, ear canal and external ear normal.      Nose: Nose normal.      Right Sinus: No maxillary sinus tenderness or frontal sinus tenderness.      Left Sinus: No maxillary sinus tenderness or frontal sinus tenderness.      Mouth/Throat:      Mouth: Mucous membranes are moist.      Pharynx: Oropharynx is clear. Uvula midline. No oropharyngeal exudate or posterior oropharyngeal erythema.      Tonsils: No tonsillar exudate.   Eyes:      Extraocular Movements: Extraocular movements intact.      Conjunctiva/sclera: Conjunctivae normal.   Cardiovascular:      Rate and Rhythm: Normal rate and regular rhythm.      Heart sounds: " "Normal heart sounds.   Pulmonary:      Effort: Pulmonary effort is normal.      Breath sounds: Normal breath sounds.   Abdominal:      General: Bowel sounds are normal. There is no distension.      Palpations: Abdomen is soft. There is no mass.      Tenderness: There is no abdominal tenderness. There is no guarding or rebound.   Musculoskeletal:      Cervical back: Neck supple. Tenderness (Left proximal trapezius) present. No edema, erythema, signs of trauma, rigidity (In all planes, except flexion) or crepitus. Pain with movement (In all planes, except flexion) and muscular tenderness (Left proximal trapezius) present. No spinous process tenderness. Decreased range of motion.   Lymphadenopathy:      Cervical: Cervical adenopathy present.      Right cervical: Posterior cervical adenopathy (Pea-sized, nontender) present. No superficial or deep cervical adenopathy.     Left cervical: Posterior cervical adenopathy (Pea-sized, tender) present. No superficial or deep cervical adenopathy.   Skin:     Findings: No rash.   Neurological:      Mental Status: She is alert and oriented to person, place, and time.   Psychiatric:         Behavior: Behavior normal.         Thought Content: Thought content normal.         Judgment: Judgment normal.         Lab Results:      Lab Results   Component Value Date    WBC 10.72 (H) 02/15/2024    HGB 14.5 02/15/2024    HCT 40.0 02/15/2024     02/15/2024    TRIG 85 01/10/2024    HDL 52 01/10/2024    ALT 7 02/15/2024    AST 12 (L) 02/15/2024    K 3.4 (L) 02/15/2024     02/15/2024    CREATININE 0.50 (L) 02/15/2024    BUN 10 02/15/2024    CO2 19 (L) 02/15/2024    INR 1.07 02/25/2022    GLUF 81 01/10/2024     No results found for: \"URICACID\"  Invalid input(s): \"BASENAME\" Vitamin D    No results found.     POCT Labs                       "

## 2024-07-26 DIAGNOSIS — Z30.42 ENCOUNTER FOR MANAGEMENT AND INJECTION OF DEPO-PROVERA: ICD-10-CM

## 2024-07-26 RX ORDER — MEDROXYPROGESTERONE ACETATE 150 MG/ML
150 INJECTION, SUSPENSION INTRAMUSCULAR
Qty: 1 ML | Refills: 3 | Status: SHIPPED | OUTPATIENT
Start: 2024-07-26 | End: 2024-10-24

## 2024-08-22 ENCOUNTER — OFFICE VISIT (OUTPATIENT)
Dept: OBGYN CLINIC | Facility: CLINIC | Age: 24
End: 2024-08-22
Payer: COMMERCIAL

## 2024-08-22 VITALS
SYSTOLIC BLOOD PRESSURE: 112 MMHG | DIASTOLIC BLOOD PRESSURE: 62 MMHG | WEIGHT: 105 LBS | HEIGHT: 61 IN | BODY MASS INDEX: 19.83 KG/M2

## 2024-08-22 DIAGNOSIS — Z30.42 ENCOUNTER FOR SURVEILLANCE OF INJECTABLE CONTRACEPTIVE: Primary | ICD-10-CM

## 2024-08-22 LAB — SL AMB POCT URINE HCG: NEGATIVE

## 2024-08-22 PROCEDURE — 81025 URINE PREGNANCY TEST: CPT | Performed by: PHYSICIAN ASSISTANT

## 2024-08-22 PROCEDURE — 96372 THER/PROPH/DIAG INJ SC/IM: CPT | Performed by: PHYSICIAN ASSISTANT

## 2024-08-22 PROCEDURE — 99212 OFFICE O/P EST SF 10 MIN: CPT | Performed by: PHYSICIAN ASSISTANT

## 2024-08-22 RX ORDER — MEDROXYPROGESTERONE ACETATE 150 MG/ML
150 INJECTION, SUSPENSION INTRAMUSCULAR ONCE
Status: COMPLETED | OUTPATIENT
Start: 2024-08-22 | End: 2024-08-22

## 2024-08-22 RX ADMIN — MEDROXYPROGESTERONE ACETATE 150 MG: 150 INJECTION, SUSPENSION INTRAMUSCULAR at 15:47

## 2024-08-22 NOTE — PROGRESS NOTES
Assessment/Plan:   - UPT negative in our office today  - DEPO provera administered in L. Deltoid. Patient tolerated well  - Call for concerns  - RTO 3 months      Diagnoses and all orders for this visit:    Encounter for surveillance of injectable contraceptive  -     POCT urine HCG  -     medroxyPROGESTERone (DEPO-PROVERA) IM injection 150 mg          Subjective:     Patient ID: Cece Butler is a 24 y.o. female.    Cece is a 25YO  WF presenting to the office for a DEPO injection. Patient has requested injection to be administered by a provider as she had a negative experience in the past.             Objective:     Physical Exam  Constitutional:       General: She is not in acute distress.     Appearance: Normal appearance. She is normal weight. She is not ill-appearing, toxic-appearing or diaphoretic.   HENT:      Head: Normocephalic and atraumatic.   Musculoskeletal:      Left upper arm: No swelling, edema, deformity, lacerations, tenderness or bony tenderness.      Comments: DEPO administered L. Deltoid without complication   Neurological:      General: No focal deficit present.      Mental Status: She is alert.   Psychiatric:         Mood and Affect: Mood normal.         Behavior: Behavior normal.

## 2024-09-04 ENCOUNTER — OFFICE VISIT (OUTPATIENT)
Dept: FAMILY MEDICINE CLINIC | Facility: CLINIC | Age: 24
End: 2024-09-04
Payer: COMMERCIAL

## 2024-09-04 VITALS
HEART RATE: 66 BPM | BODY MASS INDEX: 20.01 KG/M2 | WEIGHT: 106 LBS | OXYGEN SATURATION: 99 % | DIASTOLIC BLOOD PRESSURE: 70 MMHG | SYSTOLIC BLOOD PRESSURE: 118 MMHG | HEIGHT: 61 IN

## 2024-09-04 DIAGNOSIS — L75.0 BODY ODOR: Primary | ICD-10-CM

## 2024-09-04 PROCEDURE — 3725F SCREEN DEPRESSION PERFORMED: CPT | Performed by: FAMILY MEDICINE

## 2024-09-04 PROCEDURE — 99213 OFFICE O/P EST LOW 20 MIN: CPT | Performed by: FAMILY MEDICINE

## 2024-09-04 NOTE — PROGRESS NOTES
"Ambulatory Visit  Name: Cece Butler      : 2000      MRN: [id number]  Encounter Provider: Alondra Peralta DO  Encounter Date: 2024   Encounter department: [facility]    Assessment & Plan   1. Body odor  Comments:  lumps pt describes perhaps c/w blocked pores, though none are present now  may use drysol to see if this helps area  also use antibacterial soap in underarms.  Call if sx worsen--could try topical abx  Orders:  -     aluminum chloride (DRYSOL) 20 % external solution; Apply topically daily at bedtime       History of Present Illness     HPI  Pt c/o lumps in underarms which look like blackheads, get swollen, and then go down.  When present these smell strongly of body odor.  Changing deodorants has not helped.  She is not sure what is causing.  States lesions were there this AM.    Review of Systems    Objective     /70 (BP Location: Left arm, Patient Position: Sitting, Cuff Size: Standard)   Pulse 66   Ht 5' 1\" (1.549 m)   Wt 48.1 kg (106 lb)   SpO2 99%   BMI 20.03 kg/m²     Physical Exam  Constitutional:       Appearance: Normal appearance.   Cardiovascular:      Rate and Rhythm: Normal rate and regular rhythm.   Pulmonary:      Effort: Pulmonary effort is normal.      Breath sounds: Normal breath sounds. No wheezing, rhonchi or rales.   Chest:      Comments: L axilla is normal.  Some clear pores are present, but no signs of lesions or adenopathy.    Neurological:      General: No focal deficit present.      Mental Status: She is alert and oriented to person, place, and time.       Administrative Statements           "

## 2024-09-04 NOTE — PATIENT INSTRUCTIONS
Antibacteria soap for underarms--if this doesn't work, we can try an antibiotic solution and see if that is more helpful  Dry sol at night  Send picture if lumps return

## 2024-09-30 DIAGNOSIS — Z30.42 ENCOUNTER FOR MANAGEMENT AND INJECTION OF DEPO-PROVERA: ICD-10-CM

## 2024-09-30 RX ORDER — MEDROXYPROGESTERONE ACETATE 150 MG/ML
150 INJECTION, SUSPENSION INTRAMUSCULAR
Qty: 1 ML | Refills: 3 | Status: SHIPPED | OUTPATIENT
Start: 2024-09-30 | End: 2024-12-29

## 2024-11-13 ENCOUNTER — OFFICE VISIT (OUTPATIENT)
Dept: OBGYN CLINIC | Facility: CLINIC | Age: 24
End: 2024-11-13
Payer: COMMERCIAL

## 2024-11-13 VITALS — DIASTOLIC BLOOD PRESSURE: 60 MMHG | SYSTOLIC BLOOD PRESSURE: 102 MMHG

## 2024-11-13 DIAGNOSIS — N94.6 DYSMENORRHEA: ICD-10-CM

## 2024-11-13 DIAGNOSIS — Z30.42 ENCOUNTER FOR SURVEILLANCE OF INJECTABLE CONTRACEPTIVE: Primary | ICD-10-CM

## 2024-11-13 PROCEDURE — 99213 OFFICE O/P EST LOW 20 MIN: CPT | Performed by: PHYSICIAN ASSISTANT

## 2024-11-13 PROCEDURE — 96372 THER/PROPH/DIAG INJ SC/IM: CPT | Performed by: PHYSICIAN ASSISTANT

## 2024-11-13 RX ORDER — NORELGESTROMIN AND ETHINYL ESTRADIOL 35; 150 UG/MG; UG/MG
1 PATCH TRANSDERMAL WEEKLY
Qty: 12 PATCH | Refills: 0 | Status: SHIPPED | OUTPATIENT
Start: 2024-11-13 | End: 2025-02-11

## 2024-11-13 RX ORDER — MEDROXYPROGESTERONE ACETATE 150 MG/ML
150 INJECTION, SUSPENSION INTRAMUSCULAR ONCE
Status: COMPLETED | OUTPATIENT
Start: 2024-11-13 | End: 2024-11-13

## 2024-11-13 RX ADMIN — MEDROXYPROGESTERONE ACETATE 150 MG: 150 INJECTION, SUSPENSION INTRAMUSCULAR at 14:52

## 2024-11-13 NOTE — PROGRESS NOTES
The patient was given the depo injection in the LEFT DELT. The patient tolerated the injection well.     LOT: [id number]  EXP: 11/2026  NDC: 50549-272-46

## 2024-11-13 NOTE — PROGRESS NOTES
Assessment/Plan:   - DEPO administered L. Deltoid by GOMEZ Snyder PA-C  - Discussed trial of xulane patch to help with cycles.   - Can start patch now in addition to DEPO and f/u in 3 months for annual    Diagnoses and all orders for this visit:    Encounter for surveillance of injectable contraceptive  -     medroxyPROGESTERone (DEPO-PROVERA) IM injection 150 mg    Dysmenorrhea  -     norelgestromin-ethinyl estradiol (ORTHO EVRA) 150-35 MCG/24HR; Place 1 patch on the skin over 7 days once a week          Subjective:     Patient ID: Cece Butler is a 24 y.o. female.    Cece is a 25YO  WF presenting to the office for DEPO injection. She would like to discuss switching to patch after this DEPO. She reports painful periods on DEPO with cramping and unpredictable bleeding. She would like a method that is going to help regulate her cycle and minimize cramps.         Review of Systems   Genitourinary:  Positive for menstrual problem.         Objective:     Physical Exam  Vitals reviewed.   Constitutional:       General: She is not in acute distress.     Appearance: Normal appearance. She is normal weight. She is not ill-appearing, toxic-appearing or diaphoretic.   HENT:      Head: Atraumatic.   Pulmonary:      Effort: Pulmonary effort is normal.   Skin:     General: Skin is warm and dry.   Neurological:      General: No focal deficit present.      Mental Status: She is alert.   Psychiatric:         Mood and Affect: Mood normal.         Behavior: Behavior normal.

## 2024-12-17 ENCOUNTER — TELEPHONE (OUTPATIENT)
Age: 24
End: 2024-12-17

## 2024-12-17 DIAGNOSIS — N94.6 DYSMENORRHEA: Primary | ICD-10-CM

## 2024-12-17 RX ORDER — NORELGESTROMIN AND ETHINYL ESTRADIOL 35; 150 UG/MG; UG/MG
1 PATCH TRANSDERMAL WEEKLY
Qty: 12 PATCH | Refills: 1 | Status: SHIPPED | OUTPATIENT
Start: 2024-12-17 | End: 2025-03-17

## 2024-12-17 NOTE — TELEPHONE ENCOUNTER
Forwarding to PA team for review.   Orho Evar 150-35 mcg/24hr  Melva Snyder PA-C    States that the pharmacy sent over a request for prior aith for this medication.

## 2024-12-17 NOTE — TELEPHONE ENCOUNTER
Brand Name Xulane is the preferred alternative, Generic Ortho Evra is not covered. Please send rx to pharmacy as Brand Name Xulane please make sure the JALEESA box is checked off so rx when sending rx so it's sent correctly to pharmacy as Brand Name Only.

## 2024-12-20 ENCOUNTER — TELEPHONE (OUTPATIENT)
Dept: OBGYN CLINIC | Facility: CLINIC | Age: 24
End: 2024-12-20

## 2024-12-23 NOTE — TELEPHONE ENCOUNTER
PA for norelgestromin-ethinyl estradiol 150-35 MCG/24HR SUBMITTED to [employer]    via    [x]CMM-KEY: [id number]  []Surescripts-Case ID #   []Availity-Auth ID # NDC #   []Faxed to plan   []Other website   []Phone call Case ID #     []PA sent as URGENT    All office notes, labs and other pertaining documents and studies sent. Clinical questions answered. Awaiting determination from insurance company.     Turnaround time for your insurance to make a decision on your Prior Authorization can take 7-21 business days.

## 2024-12-24 ENCOUNTER — TELEPHONE (OUTPATIENT)
Dept: OBGYN CLINIC | Facility: CLINIC | Age: 24
End: 2024-12-24

## 2024-12-24 NOTE — TELEPHONE ENCOUNTER
PA for XULANE NOT REQUIRED     Reason (screenshot if applicable)          Patient advised by          [] MyChart Message  [] Phone call   []LMOM  []L/M to call office as no active Communication consent on file  []Unable to leave detailed message as VM not approved on Communication consent       Pharmacy advised by    [x]Fax  []Phone call

## 2025-02-12 ENCOUNTER — OFFICE VISIT (OUTPATIENT)
Dept: FAMILY MEDICINE CLINIC | Facility: CLINIC | Age: 25
End: 2025-02-12
Payer: COMMERCIAL

## 2025-02-12 VITALS
BODY MASS INDEX: 21.11 KG/M2 | TEMPERATURE: 99.3 F | HEART RATE: 78 BPM | DIASTOLIC BLOOD PRESSURE: 68 MMHG | SYSTOLIC BLOOD PRESSURE: 98 MMHG | HEIGHT: 61 IN | WEIGHT: 111.8 LBS | OXYGEN SATURATION: 99 %

## 2025-02-12 DIAGNOSIS — J06.9 UPPER RESPIRATORY TRACT INFECTION, UNSPECIFIED TYPE: ICD-10-CM

## 2025-02-12 DIAGNOSIS — G43.709 CHRONIC MIGRAINE WITHOUT AURA WITHOUT STATUS MIGRAINOSUS, NOT INTRACTABLE: Primary | ICD-10-CM

## 2025-02-12 PROCEDURE — 99214 OFFICE O/P EST MOD 30 MIN: CPT | Performed by: FAMILY MEDICINE

## 2025-02-12 RX ORDER — AZELASTINE HYDROCHLORIDE 137 UG/1
2 SPRAY, METERED NASAL
COMMUNITY
Start: 2025-02-06

## 2025-02-12 RX ORDER — PREDNISONE 20 MG/1
20 TABLET ORAL 2 TIMES DAILY
COMMUNITY
Start: 2025-02-06

## 2025-02-12 RX ORDER — SUMATRIPTAN SUCCINATE 100 MG/1
100 TABLET ORAL ONCE AS NEEDED
Qty: 10 TABLET | Refills: 5 | Status: SHIPPED | OUTPATIENT
Start: 2025-02-12

## 2025-02-12 RX ORDER — NORTRIPTYLINE HYDROCHLORIDE 10 MG/1
10 CAPSULE ORAL
Qty: 30 CAPSULE | Refills: 1 | Status: SHIPPED | OUTPATIENT
Start: 2025-02-12

## 2025-02-12 NOTE — PROGRESS NOTES
"Name: Cece Butler      : 2000      MRN: [id number]  Encounter Provider: Alondra Peralta DO  Encounter Date: 2025   Encounter department: [facility]  :  Assessment & Plan  Chronic migraine without aura without status migrainosus, not intractable  Nml neuro exsam  Start nortriptyline nightly for prophylaxis  Imitrex prn acute migraine  F/u 1 mo  Call for problems or concerns    Orders:    nortriptyline (PAMELOR) 10 mg capsule; Take 1 capsule (10 mg total) by mouth daily at bedtime    SUMAtriptan (IMITREX) 100 mg tablet; Take 1 tablet (100 mg total) by mouth once as needed for migraine for up to 1 dose may repeat in 2 hours if necessary. Max dose 200mg in 24 hour period.    Upper respiratory tract infection, unspecified type  Likely viral  Rest, fluids, supportive care  Call if no better in 7-10 days or if symptoms worsen and we can call in abx                History of Present Illness   HPI  Pt presents for 6-7 mo of headaches about 13 times in a week which last a 1/2 hour.  Sometimes drinking water or taking excedrin migraine helps.  Sometimes a nap helps and she wakes and the headache is gone.  Posterior.  Feels like \"squeezing\" and pressure.  +light sensitivity.  +noise sensitivity.  +nausea.  Vomiting once.  Sometimes vision blurs during headache.  Feet tingle b/l when headache occurs.  No weakness.    Pt c/o congestion, cough, runny nose, R ear pain, headache.  Took meds from ENT on .  They helped.  She now thinks she is getting sick again.  Recent sx started friday      Review of Systems  See hpi; all other systems negative    Objective   BP 98/68 (Patient Position: Sitting, Cuff Size: Standard)   Pulse 78   Temp 99.3 °F (37.4 °C) (Temporal)   Ht 5' 1\" (1.549 m)   Wt 50.7 kg (111 lb 12.8 oz)   SpO2 99%   BMI 21.12 kg/m²      Physical Exam  Constitutional:       Appearance: Normal appearance.   HENT:      Head: Normocephalic and atraumatic.      Right Ear: " Tympanic membrane, ear canal and external ear normal.      Left Ear: Tympanic membrane, ear canal and external ear normal.      Nose: Congestion present.      Mouth/Throat:      Pharynx: Posterior oropharyngeal erythema present. No oropharyngeal exudate.   Eyes:      Extraocular Movements: Extraocular movements intact.      Conjunctiva/sclera: Conjunctivae normal.      Pupils: Pupils are equal, round, and reactive to light.   Cardiovascular:      Rate and Rhythm: Normal rate and regular rhythm.      Heart sounds: No murmur heard.     No friction rub. No gallop.   Pulmonary:      Effort: Pulmonary effort is normal.      Breath sounds: Normal breath sounds. No wheezing, rhonchi or rales.   Abdominal:      General: Abdomen is flat. There is no distension.      Tenderness: There is no abdominal tenderness.   Lymphadenopathy:      Cervical: No cervical adenopathy.   Neurological:      General: No focal deficit present.      Mental Status: She is alert and oriented to person, place, and time.      Cranial Nerves: No cranial nerve deficit.      Sensory: No sensory deficit.      Motor: No weakness, tremor or pronator drift.      Coordination: Romberg sign negative. Coordination normal. Finger-Nose-Finger Test normal. Rapid alternating movements normal.      Gait: Gait normal.      Deep Tendon Reflexes: Reflexes normal.

## 2025-03-10 DIAGNOSIS — G43.709 CHRONIC MIGRAINE WITHOUT AURA WITHOUT STATUS MIGRAINOSUS, NOT INTRACTABLE: ICD-10-CM

## 2025-03-11 RX ORDER — NORTRIPTYLINE HYDROCHLORIDE 10 MG/1
10 CAPSULE ORAL
Qty: 30 CAPSULE | Refills: 1 | Status: SHIPPED | OUTPATIENT
Start: 2025-03-11

## 2025-03-14 ENCOUNTER — TELEPHONE (OUTPATIENT)
Age: 25
End: 2025-03-14

## 2025-03-14 NOTE — TELEPHONE ENCOUNTER
Requesting a dr to dr referral for hematology Has seen in the past Patient is having heavy bleeding with periods . Please advise Please call patient once referral is placed

## 2025-03-17 ENCOUNTER — TELEPHONE (OUTPATIENT)
Age: 25
End: 2025-03-17

## 2025-03-17 DIAGNOSIS — D68.00 VON WILLEBRAND DISEASE (HCC): Primary | ICD-10-CM

## 2025-03-17 NOTE — TELEPHONE ENCOUNTER
Pt calling requesting a referral to hematology. Pt has been seeing hematology due to having Von Willebrand disease, was referred by OBGYN due to bleeding concerns with period and delivery while pregnant. Pt states referral recently  and will need a new one placed. No further questions.

## 2025-03-28 ENCOUNTER — NURSE TRIAGE (OUTPATIENT)
Age: 25
End: 2025-03-28

## 2025-03-28 NOTE — TELEPHONE ENCOUNTER
"FOLLOW UP: Appointment scheduled for first available 4/3/25. Routing to provider for any additional recommendations as appointment scheduled outside of suggested 3 day window to be seen in office.    REASON FOR CONVERSATION: Breast Mass    SYMPTOMS: right breast mass, right nipple discharge, bilateral breast pain/pain in nipples, nipples constantly erect.     OTHER: discharge from right nipple clear - noticed when taking bra off her nipple was sticking to bra. Symptoms started on 3/22/25. Right breast mass small and located about 2-3cm above nipple. Both breasts have had pain and tenderness for the past week as well. Nipples extremely sensitive and constantly erect.     DISPOSITION: See Within 3 Days in Office      Reason for Disposition   Breast lump    Answer Assessment - Initial Assessment Questions  1. SYMPTOM: \"What's the main symptom you're concerned about?\"  (e.g., lump, nipple discharge, pain, rash )      Right breast mass, right nipple discharge. Bilateral breast pain/pain in nipples, nipples constantly erect.   2. LOCATION: \"Where is the symptoms located?\"      B/l breasts, mass and nipple discharge right breast  3. ONSET: \"When did symptoms  start?\"      3/22/25  4. PRIOR HISTORY: \"Do you have any history of prior problems with your breasts?\" (e.g., breast cancer, breast implant, fibrocystic breast disease)      denies  5. CAUSE: \"What do you think is causing this symptom?\"      unknown  6. OTHER SYMPTOMS: \"Do you have any other symptoms?\" (e.g., fever, breast pain, nipple discharge, redness or rash)      denies  7. PREGNANCY-BREASTFEEDING: \"Is there any chance you are pregnant?\" \"When was your last menstrual period?\" \"Are you breastfeeding?\"      Lmp 3/16/25    Protocols used: Breast Symptoms-Adult-[place]    "

## 2025-04-01 ENCOUNTER — TELEPHONE (OUTPATIENT)
Dept: HEMATOLOGY ONCOLOGY | Facility: CLINIC | Age: 25
End: 2025-04-01

## 2025-04-01 NOTE — TELEPHONE ENCOUNTER
I phoned the patient, introduced myself, and indicated that I was calling from [facility], Dr. Street's office, regarding a change in time to her 4/8 consult appointment. We reviewed that the appointment would need to be moved 20 minutes later to 1520 from 1500 with Dr. Street that day. Cece indicated that this would work for her and I explained that it would update on her MyChart.

## 2025-04-03 ENCOUNTER — OFFICE VISIT (OUTPATIENT)
Dept: OBGYN CLINIC | Facility: CLINIC | Age: 25
End: 2025-04-03
Payer: COMMERCIAL

## 2025-04-03 VITALS
BODY MASS INDEX: 21.34 KG/M2 | WEIGHT: 113 LBS | DIASTOLIC BLOOD PRESSURE: 70 MMHG | SYSTOLIC BLOOD PRESSURE: 118 MMHG | HEIGHT: 61 IN

## 2025-04-03 DIAGNOSIS — N64.4 BREAST PAIN: Primary | ICD-10-CM

## 2025-04-03 PROCEDURE — 99213 OFFICE O/P EST LOW 20 MIN: CPT | Performed by: OBSTETRICS & GYNECOLOGY

## 2025-04-03 NOTE — PROGRESS NOTES
"Assessment/Plan    Diagnoses and all orders for this visit:    Breast pain    Reviewed cyclical breast pain.  We also discussed the importance of avoiding nipple stimulation to further trigger discharge.  No lumps palpated on exam and patient reassured.  All questions answered.    We also reviewed her concern of rash with patch.  Patient encouraged to alternate location of patch placement.  No rash currently present around her patch.      Subjective  Chief Complaint   Patient presents with    Breast Mass     Pt states in the shower she felt a small bump on her right breast. Pt also noticed white discharge from her nipple the next day. Pt also noticed a rash from her bc patch.         Cece Butlre is a 24 y.o.  female here for a problem visit.  She started noticing over the last week that her right breast was more sore and has some \"white sticky\" discharge coming from it.  She also reports increased sensitivity to her right nipple.  She thinks that there may be a small lump right by her nipple on that breast.  She also reports concerns regarding her birth control patch as she has been placing it in the same location every single time and is starting to develop a rash.  She has not ever placed in a different location than her back.    Patient Active Problem List   Diagnosis    Von Willebrand disease (HCC)    Gastroesophageal reflux disease         Gynecologic History  Patient's last menstrual period was 2025 (within days).      Obstetric History  OB History    Para Term  AB Living   2 1 1  1 1   SAB IAB Ectopic Multiple Live Births   1   0 1      # Outcome Date GA Lbr Jamie/2nd Weight Sex Type Anes PTL Lv   2 SAB 25 6w0d          1 Term 23 39w4d  3850 g (8 lb 7.8 oz) F CS-LTranv   LORI      Obstetric Comments   Menarche: 14       Allergies  Other    Medications    Current Outpatient Medications:     Azelastine HCl 137 MCG/SPRAY SOLN, 2 sprays into each nostril daily at bedtime, " "Disp: , Rfl:     nortriptyline (PAMELOR) 10 mg capsule, TAKE 1 CAPSULE (10 MG TOTAL) BY MOUTH DAILY AT BEDTIME, Disp: 30 capsule, Rfl: 1    predniSONE 20 mg tablet, Take 20 mg by mouth 2 (two) times a day, Disp: , Rfl:     SUMAtriptan (IMITREX) 100 mg tablet, Take 1 tablet (100 mg total) by mouth once as needed for migraine for up to 1 dose may repeat in 2 hours if necessary. Max dose 200mg in 24 hour period., Disp: 10 tablet, Rfl: 5    norelgestromin-ethinyl estradiol (ORTHO EVRA) 150-35 MCG/24HR, Place 1 patch on the skin over 7 days once a week, Disp: 12 patch, Rfl: 1      Review of Systems  Review of Systems       Objective     /70 (BP Location: Right arm, Patient Position: Sitting, Cuff Size: Standard)   Ht 5' 1\" (1.549 m)   Wt 51.3 kg (113 lb)   LMP 03/13/2025 (Within Days)   BMI 21.35 kg/m²       Physical Exam  Constitutional:       Appearance: Normal appearance.   Genitourinary:   Breasts:     Right: Tenderness present. No inverted nipple, mass, nipple discharge or skin change.      Left: Normal. No inverted nipple, mass, nipple discharge, skin change or tenderness.   HENT:      Head: Normocephalic and atraumatic.   Eyes:      Extraocular Movements: Extraocular movements intact.   Pulmonary:      Effort: Pulmonary effort is normal.   Musculoskeletal:         General: Normal range of motion.   Neurological:      Mental Status: She is alert. Mental status is at baseline.   Psychiatric:         Mood and Affect: Mood normal.         Behavior: Behavior normal.               "

## 2025-04-04 DIAGNOSIS — G43.709 CHRONIC MIGRAINE WITHOUT AURA WITHOUT STATUS MIGRAINOSUS, NOT INTRACTABLE: ICD-10-CM

## 2025-04-04 RX ORDER — NORTRIPTYLINE HYDROCHLORIDE 10 MG/1
10 CAPSULE ORAL
Qty: 90 CAPSULE | Refills: 0 | Status: SHIPPED | OUTPATIENT
Start: 2025-04-04

## 2025-04-08 ENCOUNTER — CONSULT (OUTPATIENT)
Dept: HEMATOLOGY ONCOLOGY | Facility: CLINIC | Age: 25
End: 2025-04-08
Payer: COMMERCIAL

## 2025-04-08 ENCOUNTER — TELEPHONE (OUTPATIENT)
Dept: OBGYN CLINIC | Facility: CLINIC | Age: 25
End: 2025-04-08

## 2025-04-08 VITALS
RESPIRATION RATE: 16 BRPM | WEIGHT: 113 LBS | DIASTOLIC BLOOD PRESSURE: 80 MMHG | HEART RATE: 84 BPM | TEMPERATURE: 97.8 F | SYSTOLIC BLOOD PRESSURE: 118 MMHG | OXYGEN SATURATION: 99 % | HEIGHT: 61 IN | BODY MASS INDEX: 21.34 KG/M2

## 2025-04-08 DIAGNOSIS — D50.0 IRON DEFICIENCY ANEMIA DUE TO CHRONIC BLOOD LOSS: ICD-10-CM

## 2025-04-08 DIAGNOSIS — D68.00 VON WILLEBRAND DISEASE (HCC): Primary | ICD-10-CM

## 2025-04-08 PROCEDURE — 99204 OFFICE O/P NEW MOD 45 MIN: CPT | Performed by: INTERNAL MEDICINE

## 2025-04-09 NOTE — TELEPHONE ENCOUNTER
Duplicate encounter created, please see telephone encounter from 12/20/2024 regarding norelgestromin-ethinyl estradiol 150-35 MCG/24HR PA status. Please review patient's chart to see if there is already an encounter regarding the medication in question and to document anything regarding this medication in regards to anything regarding the authorization process etc before creating another encounter Thank You.

## 2025-04-12 ENCOUNTER — APPOINTMENT (OUTPATIENT)
Dept: LAB | Facility: CLINIC | Age: 25
End: 2025-04-12
Payer: COMMERCIAL

## 2025-04-12 DIAGNOSIS — D68.00 VON WILLEBRAND DISEASE (HCC): ICD-10-CM

## 2025-04-12 DIAGNOSIS — D50.0 IRON DEFICIENCY ANEMIA DUE TO CHRONIC BLOOD LOSS: ICD-10-CM

## 2025-04-12 LAB
BASOPHILS # BLD AUTO: 0.1 THOUSANDS/ÂΜL (ref 0–0.1)
BASOPHILS NFR BLD AUTO: 1 % (ref 0–1)
EOSINOPHIL # BLD AUTO: 0.98 THOUSAND/ÂΜL (ref 0–0.61)
EOSINOPHIL NFR BLD AUTO: 14 % (ref 0–6)
ERYTHROCYTE [DISTWIDTH] IN BLOOD BY AUTOMATED COUNT: 12.2 % (ref 11.6–15.1)
FERRITIN SERPL-MCNC: 8 NG/ML (ref 30–307)
HCT VFR BLD AUTO: 46.3 % (ref 34.8–46.1)
HGB BLD-MCNC: 15.4 G/DL (ref 11.5–15.4)
IMM GRANULOCYTES # BLD AUTO: 0.01 THOUSAND/UL (ref 0–0.2)
IMM GRANULOCYTES NFR BLD AUTO: 0 % (ref 0–2)
IRON SATN MFR SERPL: 30 % (ref 15–50)
IRON SERPL-MCNC: 135 UG/DL (ref 50–212)
LYMPHOCYTES # BLD AUTO: 3.12 THOUSANDS/ÂΜL (ref 0.6–4.47)
LYMPHOCYTES NFR BLD AUTO: 43 % (ref 14–44)
MCH RBC QN AUTO: 31.2 PG (ref 26.8–34.3)
MCHC RBC AUTO-ENTMCNC: 33.3 G/DL (ref 31.4–37.4)
MCV RBC AUTO: 94 FL (ref 82–98)
MONOCYTES # BLD AUTO: 0.37 THOUSAND/ÂΜL (ref 0.17–1.22)
MONOCYTES NFR BLD AUTO: 5 % (ref 4–12)
NEUTROPHILS # BLD AUTO: 2.65 THOUSANDS/ÂΜL (ref 1.85–7.62)
NEUTS SEG NFR BLD AUTO: 37 % (ref 43–75)
NRBC BLD AUTO-RTO: 0 /100 WBCS
PLATELET # BLD AUTO: 317 THOUSANDS/UL (ref 149–390)
PMV BLD AUTO: 11.8 FL (ref 8.9–12.7)
RBC # BLD AUTO: 4.93 MILLION/UL (ref 3.81–5.12)
TIBC SERPL-MCNC: 445.2 UG/DL (ref 250–450)
TRANSFERRIN SERPL-MCNC: 318 MG/DL (ref 203–362)
UIBC SERPL-MCNC: 310 UG/DL (ref 155–355)
WBC # BLD AUTO: 7.23 THOUSAND/UL (ref 4.31–10.16)

## 2025-04-12 PROCEDURE — 85246 CLOT FACTOR VIII VW ANTIGEN: CPT

## 2025-04-12 PROCEDURE — 85025 COMPLETE CBC W/AUTO DIFF WBC: CPT

## 2025-04-12 PROCEDURE — 83550 IRON BINDING TEST: CPT

## 2025-04-12 PROCEDURE — 36415 COLL VENOUS BLD VENIPUNCTURE: CPT

## 2025-04-12 PROCEDURE — 83540 ASSAY OF IRON: CPT

## 2025-04-12 PROCEDURE — 85245 CLOT FACTOR VIII VW RISTOCTN: CPT

## 2025-04-12 PROCEDURE — 85240 CLOT FACTOR VIII AHG 1 STAGE: CPT

## 2025-04-12 PROCEDURE — 82728 ASSAY OF FERRITIN: CPT

## 2025-04-12 NOTE — ASSESSMENT & PLAN NOTE
Orders:    von Willebrand Profile; Future    Iron Panel (Includes Ferritin, Iron Sat%, Iron, and TIBC); Future    CBC and differential; Future    I will check von Willebrand levels now.  They will likely be normal because of her estrogen patch.  I will call her with these results.  I did discuss the fact that there is some genetic testing out there to try to further clarify if patients have von Willebrand disease or not.  That is often a relatively high out-of-pocket cost.  We can revisit the idea of this after we have her labs.  She has type of a positive blood in patients with oh type blood can clear von Willebrand from circulation faster.  That may also be why she would have had borderline low levels in the past.  Unfortunately I do not have any of her information from 12 years ago when she was originally diagnosed in [place].

## 2025-04-12 NOTE — PROGRESS NOTES
Name: Cece Butler      : 2000      MRN: [id number]  Encounter Provider: Debi Street DO  Encounter Date: 2025   Encounter department: [facility] [place]  :  Assessment & Plan  Von Willebrand disease (HCC)    Orders:    von Willebrand Profile; Future    Iron Panel (Includes Ferritin, Iron Sat%, Iron, and TIBC); Future    CBC and differential; Future    I will check von Willebrand levels now.  They will likely be normal because of her estrogen patch.  I will call her with these results.  I did discuss the fact that there is some genetic testing out there to try to further clarify if patients have von Willebrand disease or not.  That is often a relatively high out-of-pocket cost.  We can revisit the idea of this after we have her labs.  She has type of a positive blood in patients with oh type blood can clear von Willebrand from circulation faster.  That may also be why she would have had borderline low levels in the past.  Unfortunately I do not have any of her information from 12 years ago when she was originally diagnosed in [place].  Iron deficiency anemia due to chronic blood loss    Orders:    von Willebrand Profile; Future    Iron Panel (Includes Ferritin, Iron Sat%, Iron, and TIBC); Future    CBC and differential; Future    She has had heavy menses.  I will check her iron levels and see if she needs any iron replacement.  In the past she has not tolerated oral iron due to constipation.  We will make follow-up arrangements after I see what her blood work shows and call her.    Return if symptoms worsen or fail to improve.    History of Present Illness   Chief Complaint   Patient presents with    Consult     24-year-old female with a history of von Willebrand disease.  She was diagnosed at age 13 in [place] after she began having heavy menses.  I do not have any records from that time.  She took birth control and that helped.  Her other bleeding  "is characterized by easy bruising.  She also has prolonged bleeding times.  She has had tattoos in the past the blood for a long period of time.  She did have her wisdom teeth removed with no problems.  She had 1 pregnancy in 2022.  After the pregnancy she bled for 3 to 4 weeks relatively heavily.  She was not treated with any antifibrinolytic agents or DDAVP.  She did see Dr. Lea at that time who recommended DDAVP as needed.  She has had iron deficiency in the past.  The two Factor VIII von Willebrand activities I have in my system in 2022 were normal but she was pregnant at the time.  She has type O+ blood.  Her platelet count is normal.  Currently she is on an estrogen and progesterone containing birth control patch.  She was referred back for evaluation because she is still having heavy menstrual periods despite this.  Prior to being on the estrogen and progesterone containing birth control patch she was on Depo.  She does not have any known family history of von Willebrand.  Her daughter does have easy bruising.  She works as a behavioral technician.  She denies tobacco or alcohol.    Pertinent Medical History       migraines     Review of Systems otherwise neg        Objective   /80 (BP Location: Left arm, Patient Position: Sitting, Cuff Size: Adult)   Pulse 84   Temp 97.8 °F (36.6 °C) (Temporal)   Resp 16   Ht 5' 1\" (1.549 m)   Wt 51.3 kg (113 lb)   LMP 03/13/2025 (Within Days)   SpO2 99%   BMI 21.35 kg/m²     Physical Exam    GEN: Alert, awake oriented x3, in no acute distress  HEENT- No pallor, icterus, cyanosis, no oral mucosal lesions,   LAD - no palpable cervical, clavicle, axillary, inguinal LAD  Heart- normal S1 S2, regular rate and rhythm, No murmur, rubs.   Lungs- clear breathing sound bilateral.   Abdomen- soft, Non tender, bowel sounds present  Extremities- No cyanosis, clubbing, edema  Neuro- No focal neurological deficit      Labs: I have reviewed the following labs:  Results " for orders placed or performed in visit on 08/22/24   POCT urine HCG   Result Value Ref Range    URINE HCG negative

## 2025-04-14 LAB
FACT XIIIA PPP-ACNC: 92 % (ref 56–140)
VWF AG ACT/NOR PPP IA: 62 % (ref 50–200)
VWF:RCO ACT/NOR PPP PL AGG: 53 % (ref 50–200)

## 2025-04-15 DIAGNOSIS — N94.6 DYSMENORRHEA: ICD-10-CM

## 2025-04-15 RX ORDER — NORELGESTROMIN AND ETHINYL ESTRADIOL 35; 150 UG/MG; UG/MG
1 PATCH TRANSDERMAL WEEKLY
Qty: 12 PATCH | Refills: 4 | Status: SHIPPED | OUTPATIENT
Start: 2025-04-15 | End: 2025-07-14

## 2025-04-16 ENCOUNTER — TELEPHONE (OUTPATIENT)
Dept: OBGYN CLINIC | Facility: CLINIC | Age: 25
End: 2025-04-16

## 2025-04-16 NOTE — TELEPHONE ENCOUNTER
Spoke with pharmacist at [facility] (in patients chart). Ortho Evra was sent to pharmacy 4/15/25 and medication did go through and does not need a prior auth.

## 2025-04-17 ENCOUNTER — TELEPHONE (OUTPATIENT)
Dept: HEMATOLOGY ONCOLOGY | Facility: CLINIC | Age: 25
End: 2025-04-17

## 2025-04-17 DIAGNOSIS — D50.0 IRON DEFICIENCY ANEMIA DUE TO CHRONIC BLOOD LOSS: Primary | ICD-10-CM

## 2025-04-17 RX ORDER — SODIUM CHLORIDE 9 MG/ML
20 INJECTION, SOLUTION INTRAVENOUS ONCE
Status: CANCELLED | OUTPATIENT
Start: 2025-04-17

## 2025-04-17 NOTE — TELEPHONE ENCOUNTER
Infusion scheduling - please scheduled patient for Blanca Castellon clerical - please schedule 1 year follow up appointment with Dr. Street    Thank you

## 2025-04-17 NOTE — TELEPHONE ENCOUNTER
The patient about her labs.  Her von Willebrand studies were within normal range.  The activity was low normal.  If she would continue to have problems with heavy periods she could try some tranexamic acid.  She will let me know if that becomes a recurrent problem.  I am also going to set her up for a course of Feraheme.  I will see her in 1 year with repeat von Willebrand studies and iron studies prior.  She knows to call in the interim with any concerns.

## 2025-04-18 ENCOUNTER — TELEPHONE (OUTPATIENT)
Dept: HEMATOLOGY ONCOLOGY | Facility: CLINIC | Age: 25
End: 2025-04-18

## 2025-04-18 ENCOUNTER — VBI (OUTPATIENT)
Dept: ADMINISTRATIVE | Facility: OTHER | Age: 25
End: 2025-04-18

## 2025-04-18 DIAGNOSIS — D50.0 IRON DEFICIENCY ANEMIA DUE TO CHRONIC BLOOD LOSS: Primary | ICD-10-CM

## 2025-04-18 RX ORDER — SODIUM CHLORIDE 9 MG/ML
20 INJECTION, SOLUTION INTRAVENOUS ONCE
OUTPATIENT
Start: 2025-04-24

## 2025-04-18 NOTE — TELEPHONE ENCOUNTER
04/18/25 9:01 AM     Chart reviewed for Pap Smear (HPV) aka Cervical Cancer Screening was/were not submitted to the patient's insurance.     Virginia Hunt MA   [facility]

## 2025-05-01 ENCOUNTER — TELEPHONE (OUTPATIENT)
Dept: OBGYN CLINIC | Facility: CLINIC | Age: 25
End: 2025-05-01

## 2025-05-01 NOTE — TELEPHONE ENCOUNTER
Multiple prior authorization follow ups received. I called patients pharmacy in chart and spoke with Mikey @11:10. Per Mikey no prior auth is needed. Script was being processed as generic. Patient was able to get a one month supply.

## 2025-05-02 ENCOUNTER — APPOINTMENT (EMERGENCY)
Dept: RADIOLOGY | Facility: HOSPITAL | Age: 25
End: 2025-05-02
Payer: COMMERCIAL

## 2025-05-02 ENCOUNTER — HOSPITAL ENCOUNTER (EMERGENCY)
Facility: HOSPITAL | Age: 25
Discharge: HOME | End: 2025-05-02
Attending: EMERGENCY MEDICINE
Payer: COMMERCIAL

## 2025-05-02 ENCOUNTER — HOSPITAL ENCOUNTER (OUTPATIENT)
Dept: INFUSION CENTER | Facility: CLINIC | Age: 25
End: 2025-05-02
Attending: INTERNAL MEDICINE
Payer: COMMERCIAL

## 2025-05-02 VITALS
DIASTOLIC BLOOD PRESSURE: 55 MMHG | SYSTOLIC BLOOD PRESSURE: 104 MMHG | RESPIRATION RATE: 18 BRPM | OXYGEN SATURATION: 97 % | HEART RATE: 67 BPM

## 2025-05-02 VITALS
RESPIRATION RATE: 16 BRPM | HEART RATE: 65 BPM | DIASTOLIC BLOOD PRESSURE: 58 MMHG | OXYGEN SATURATION: 99 % | SYSTOLIC BLOOD PRESSURE: 94 MMHG | TEMPERATURE: 98.6 F

## 2025-05-02 DIAGNOSIS — T78.40XA ACUTE ALLERGIC REACTION, INITIAL ENCOUNTER: ICD-10-CM

## 2025-05-02 DIAGNOSIS — L50.9 URTICARIA: Primary | ICD-10-CM

## 2025-05-02 DIAGNOSIS — D50.0 IRON DEFICIENCY ANEMIA DUE TO CHRONIC BLOOD LOSS: Primary | ICD-10-CM

## 2025-05-02 PROCEDURE — 99283 EMERGENCY DEPT VISIT LOW MDM: CPT

## 2025-05-02 PROCEDURE — 96365 THER/PROPH/DIAG IV INF INIT: CPT

## 2025-05-02 PROCEDURE — 99284 EMERGENCY DEPT VISIT MOD MDM: CPT | Performed by: EMERGENCY MEDICINE

## 2025-05-02 PROCEDURE — 96374 THER/PROPH/DIAG INJ IV PUSH: CPT

## 2025-05-02 PROCEDURE — 96375 TX/PRO/DX INJ NEW DRUG ADDON: CPT

## 2025-05-02 PROCEDURE — 96361 HYDRATE IV INFUSION ADD-ON: CPT

## 2025-05-02 PROCEDURE — 71045 X-RAY EXAM CHEST 1 VIEW: CPT

## 2025-05-02 PROCEDURE — 93005 ELECTROCARDIOGRAM TRACING: CPT

## 2025-05-02 RX ORDER — FAMOTIDINE 10 MG/ML
20 INJECTION, SOLUTION INTRAVENOUS ONCE
Status: COMPLETED | OUTPATIENT
Start: 2025-05-02 | End: 2025-05-02

## 2025-05-02 RX ORDER — ONDANSETRON 2 MG/ML
4 INJECTION INTRAMUSCULAR; INTRAVENOUS ONCE
Status: COMPLETED | OUTPATIENT
Start: 2025-05-02 | End: 2025-05-02

## 2025-05-02 RX ORDER — METHYLPREDNISOLONE SODIUM SUCCINATE 125 MG/2ML
125 INJECTION, POWDER, LYOPHILIZED, FOR SOLUTION INTRAMUSCULAR; INTRAVENOUS ONCE
Status: COMPLETED | OUTPATIENT
Start: 2025-05-02 | End: 2025-05-02

## 2025-05-02 RX ORDER — BENZOCAINE/MENTHOL 6 MG-10 MG
LOZENGE MUCOUS MEMBRANE ONCE
Status: COMPLETED | OUTPATIENT
Start: 2025-05-02 | End: 2025-05-02

## 2025-05-02 RX ORDER — SODIUM CHLORIDE 9 MG/ML
20 INJECTION, SOLUTION INTRAVENOUS ONCE
OUTPATIENT
Start: 2025-05-09

## 2025-05-02 RX ORDER — DIPHENHYDRAMINE HYDROCHLORIDE 50 MG/ML
50 INJECTION, SOLUTION INTRAMUSCULAR; INTRAVENOUS ONCE
Status: COMPLETED | OUTPATIENT
Start: 2025-05-02 | End: 2025-05-02

## 2025-05-02 RX ORDER — SODIUM CHLORIDE 9 MG/ML
20 INJECTION, SOLUTION INTRAVENOUS ONCE
Status: COMPLETED | OUTPATIENT
Start: 2025-05-02 | End: 2025-05-02

## 2025-05-02 RX ORDER — PREDNISONE 20 MG/1
40 TABLET ORAL DAILY
Qty: 8 TABLET | Refills: 0 | Status: SHIPPED | OUTPATIENT
Start: 2025-05-03 | End: 2025-05-06

## 2025-05-02 RX ORDER — FAMOTIDINE 20 MG/1
20 TABLET, FILM COATED ORAL DAILY
Qty: 7 TABLET | Refills: 0 | Status: SHIPPED | OUTPATIENT
Start: 2025-05-02 | End: 2025-05-09

## 2025-05-02 RX ADMIN — SODIUM CHLORIDE 20 ML/HR: 0.9 INJECTION, SOLUTION INTRAVENOUS at 14:59

## 2025-05-02 RX ADMIN — IRON SUCROSE 300 MG: 20 INJECTION, SOLUTION INTRAVENOUS at 14:59

## 2025-05-02 RX ADMIN — ONDANSETRON 4 MG: 2 INJECTION, SOLUTION INTRAMUSCULAR; INTRAVENOUS at 17:36

## 2025-05-02 RX ADMIN — DIPHENHYDRAMINE HYDROCHLORIDE 50 MG: 50 INJECTION, SOLUTION INTRAMUSCULAR; INTRAVENOUS at 17:37

## 2025-05-02 RX ADMIN — FAMOTIDINE 20 MG: 10 INJECTION INTRAVENOUS at 17:39

## 2025-05-02 RX ADMIN — HYDROCORTISONE: 1 CREAM TOPICAL at 21:55

## 2025-05-02 RX ADMIN — SODIUM CHLORIDE 1000 ML: 0.9 INJECTION, SOLUTION INTRAVENOUS at 19:00

## 2025-05-02 RX ADMIN — METHYLPREDNISOLONE SODIUM SUCCINATE 125 MG: 125 INJECTION, POWDER, FOR SOLUTION INTRAMUSCULAR; INTRAVENOUS at 17:32

## 2025-05-02 NOTE — PROGRESS NOTES
Patient tolerated treatment without incident. Peripheral IV removed. Next appointment confirmed for 5/9/2025 at 1500. AVS offered and declined

## 2025-05-02 NOTE — PROGRESS NOTES
Patient returned to infusion with  in wheelchair approximately 20 minutes post infusion with complaints of itching and hives, feet swelling and bilateral feet pain. Report being unable to stand. VSS. Transferred to ED room 8 via wheelchair.

## 2025-05-02 NOTE — ED PROVIDER NOTES
ED Disposition       None          Assessment & Plan   {Hyperlinks  Risk Stratification - NIHSS - HEART SCORE - Fill out sepsis note and make sure you call [phone] if severe or septic shock:[id number]}    MDM         Medications - No data to display    ED Risk Strat Scores                    No data recorded                            History of Present Illness   {Hyperlinks  History (Med, Surg, Fam, Social) - Current Medications - Allergies  :[id number]}    Chief Complaint   Patient presents with    Allergic Reaction     Pt from infusion center. Finished iron infusion around 1645 and now has generalized hives, left leg numbness, jaw discomfort, and throat scratchiness       Past Medical History:   Diagnosis Date    Less than 8 weeks gestation of pregnancy 02/15/2024    Varicella     immunized    Von Willebrand disease (HCC)       Past Surgical History:   Procedure Laterality Date     SECTION  22    [place]  DELIVERY ONLY N/A 2022    Procedure:  SECTION ();  Surgeon: Didi Almazan MD;  Location: [facility] ;  Service: Obstetrics      Family History   Problem Relation Age of Onset    No Known Problems Mother     COPD Father     No Known Problems Sister     No Known Problems Maternal Grandmother     Supraventricular tachycardia Maternal Grandfather     No Known Problems Paternal Grandmother     Prostate cancer Paternal Grandfather     COPD Paternal Grandfather       Social History     Tobacco Use    Smoking status: Some Days     Passive exposure: Never    Smokeless tobacco: Never   Vaping Use    Vaping status: Former   Substance Use Topics    Alcohol use: Never    Drug use: Never      E-Cigarette/Vaping    E-Cigarette Use Former User       E-Cigarette/Vaping Substances    Nicotine No     THC No     CBD No     Flavoring No     Other No     Unknown No       I have reviewed and agree with the history as documented.     HPI    Review of Systems        Objective   {Hyperlinks   Historical Vitals - Historical Labs - Chart Review/Microbiology - Last Echo - Code Status  :[id number]}    ED Triage Vitals   Temp Pulse Blood Pressure Respirations SpO2 Patient Position - Orthostatic VS   -- 25 1715    79 118/79 18 99 % Sitting      Temp src Heart Rate Source BP Location FiO2 (%) Pain Score    -- 25 -- --     Monitor Right arm        Vitals      Date and Time Temp Pulse SpO2 Resp BP Pain Score FACES Pain Rating User   25 -- 84 100 % 18 118/79 -- -- JMR   25 -- 79 99 % 18 118/79 -- -- KRISTIE            Physical Exam    Results Reviewed       None            No orders to display       Procedures    ED Medication and Procedure Management   Prior to Admission Medications   Prescriptions Last Dose Informant Patient Reported? Taking?   Azelastine HCl 137 MCG/SPRAY SOLN  Self Yes No   Si sprays into each nostril daily at bedtime   SUMAtriptan (IMITREX) 100 mg tablet  Self No No   Sig: Take 1 tablet (100 mg total) by mouth once as needed for migraine for up to 1 dose may repeat in 2 hours if necessary. Max dose 200mg in 24 hour period.   norelgestromin-ethinyl estradiol (ORTHO EVRA) 150-35 MCG/24HR   No No   Sig: Place 1 patch on the skin over 7 days once a week   nortriptyline (PAMELOR) 10 mg capsule  Self No No   Sig: TAKE 1 CAPSULE (10 MG TOTAL) BY MOUTH DAILY AT BEDTIME   predniSONE 20 mg tablet  Self Yes No   Sig: Take 20 mg by mouth 2 (two) times a day      Facility-Administered Medications: None     Patient's Medications   Discharge Prescriptions    No medications on file     No discharge procedures on file.  ED SEPSIS DOCUMENTATION          Willebrand disease (HCC)       Past Surgical History:   Procedure Laterality Date     SECTION  22    [place]  DELIVERY ONLY N/A 2022    Procedure:  SECTION ();  Surgeon: Didi Almazan MD;  Location: [facility] ;  Service: Obstetrics      Family History   Problem Relation Age of Onset    No Known Problems Mother     COPD Father     No Known Problems Sister     No Known Problems Maternal Grandmother     Supraventricular tachycardia Maternal Grandfather     No Known Problems Paternal Grandmother     Prostate cancer Paternal Grandfather     COPD Paternal Grandfather       Social History     Tobacco Use    Smoking status: Some Days     Passive exposure: Never    Smokeless tobacco: Never   Vaping Use    Vaping status: Former   Substance Use Topics    Alcohol use: Never    Drug use: Never      E-Cigarette/Vaping    E-Cigarette Use Former User       E-Cigarette/Vaping Substances    Nicotine No     THC No     CBD No     Flavoring No     Other No     Unknown No       I have reviewed and agree with the history as documented.     24-year-old female presents for evaluation of allergic reaction.  She  has a hx of von Willebrand's disease, and receives regular iron infusions.  Was at the infusion center receiving an iron infusion.  Shortly after the infusion, she reports feeling itchy, lower extremity numbness and scratchiness in her throat.  No prior history of anaphylactic reaction in the past.  Denies difficulty breathing, chest pain, facial swelling.          Review of Systems   HENT:  Negative for rhinorrhea.    Respiratory:  Negative for shortness of breath.    Neurological:  Positive for numbness.   All other systems reviewed and are negative.          Objective       ED Triage Vitals   Temp Pulse Blood Pressure Respirations SpO2 Patient Position - Orthostatic VS   -- 25 1703 25 1703 25 1703 25 1703 25 1715    79 118/79 18 99 % Sitting      Temp src Heart  Rate Source BP Location FiO2 (%) Pain Score    -- 05/02/25 1703 05/02/25 1703 -- --     Monitor Right arm        Vitals      Date and Time Temp Pulse SpO2 Resp BP Pain Score FACES Pain Rating User   05/02/25 1930 -- 67 97 % 18 104/55 -- -- [facility]   05/02/25 1830 -- 69 98 % 18 103/60 -- -- [facility]   05/02/25 1715 -- 84 100 % 18 118/79 -- -- [facility]   05/02/25 1703 -- 79 99 % 18 118/79 -- -- KRISTIE            Physical Exam  Vitals and nursing note reviewed.   Constitutional:       General: She is in acute distress.      Appearance: She is well-developed.   HENT:      Head: Normocephalic and atraumatic.      Mouth/Throat:      Pharynx: Oropharynx is clear. No posterior oropharyngeal erythema.   Eyes:      Conjunctiva/sclera: Conjunctivae normal.      Pupils: Pupils are equal, round, and reactive to light.   Cardiovascular:      Rate and Rhythm: Normal rate and regular rhythm.      Heart sounds: No murmur heard.  Pulmonary:      Effort: Pulmonary effort is normal. No respiratory distress.      Breath sounds: Normal breath sounds.   Abdominal:      Palpations: Abdomen is soft.      Tenderness: There is no abdominal tenderness.   Musculoskeletal:         General: No swelling.      Cervical back: Neck supple.   Skin:     General: Skin is warm and dry.      Capillary Refill: Capillary refill takes less than 2 seconds.      Findings: Rash present. Rash is urticarial.      Comments: Diffuse hives noted to extremities, and left side of flank.   Neurological:      General: No focal deficit present.      Mental Status: She is alert.   Psychiatric:         Mood and Affect: Mood normal.         Results Reviewed       None            XR chest 1 view portable   Final Interpretation by Declan Rutledge MD (05/02 2047)      No acute cardiopulmonary disease.            Workstation performed: [id number]             Procedures    ED Medication and Procedure Management   Prior to Admission Medications   Prescriptions Last Dose Informant Patient Reported?  Taking?   Azelastine HCl 137 MCG/SPRAY SOLN  Self Yes No   Si sprays into each nostril daily at bedtime   SUMAtriptan (IMITREX) 100 mg tablet  Self No No   Sig: Take 1 tablet (100 mg total) by mouth once as needed for migraine for up to 1 dose may repeat in 2 hours if necessary. Max dose 200mg in 24 hour period.   norelgestromin-ethinyl estradiol (ORTHO EVRA) 150-35 MCG/24HR   No No   Sig: Place 1 patch on the skin over 7 days once a week   nortriptyline (PAMELOR) 10 mg capsule  Self No No   Sig: TAKE 1 CAPSULE (10 MG TOTAL) BY MOUTH DAILY AT BEDTIME   predniSONE 20 mg tablet  Self Yes No   Sig: Take 20 mg by mouth 2 (two) times a day      Facility-Administered Medications: None     Discharge Medication List as of 2025 10:13 PM        START taking these medications    Details   famotidine (PEPCID) 20 mg tablet Take 1 tablet (20 mg total) by mouth daily for 7 days, Starting 2025, Until 2025, Normal      !! predniSONE 20 mg tablet Take 2 tablets (40 mg total) by mouth daily for 4 days Do not start before May 3, 2025., Starting Sat 5/3/2025, Until 2025, Normal       !! - Potential duplicate medications found. Please discuss with provider.        CONTINUE these medications which have NOT CHANGED    Details   Azelastine HCl 137 MCG/SPRAY SOLN 2 sprays into each nostril daily at bedtime, Starting 2025, Historical Med      norelgestromin-ethinyl estradiol (ORTHO EVRA) 150-35 MCG/24HR Place 1 patch on the skin over 7 days once a week, Starting Tue 4/15/2025, Until 2025, Normal      nortriptyline (PAMELOR) 10 mg capsule TAKE 1 CAPSULE (10 MG TOTAL) BY MOUTH DAILY AT BEDTIME, Starting 2025, Normal      !! predniSONE 20 mg tablet Take 20 mg by mouth 2 (two) times a day, Starting 2025, Historical Med      SUMAtriptan (IMITREX) 100 mg tablet Take 1 tablet (100 mg total) by mouth once as needed for migraine for up to 1 dose may repeat in 2 hours if necessary. Max  dose 200mg in 24 hour period., Starting Wed 2/12/2025, Normal       !! - Potential duplicate medications found. Please discuss with provider.          ED SEPSIS DOCUMENTATION   Time reflects when diagnosis was documented in both MDM as applicable and the Disposition within this note       Time User Action Codes Description Comment    5/2/2025  9:48 PM Amari Canales Add [L50.9] Urticaria     5/4/2025  6:50 AM Marnie Johnosn Add [T78.40XA] Acute allergic reaction, initial encounter                  Amari Canales MD  05/05/25 2036

## 2025-05-03 LAB
ATRIAL RATE: 59 BPM
P AXIS: 66 DEGREES
PR INTERVAL: 132 MS
QRS AXIS: 87 DEGREES
QRSD INTERVAL: 88 MS
QT INTERVAL: 402 MS
QTC INTERVAL: 397 MS
T WAVE AXIS: 64 DEGREES
VENTRICULAR RATE: 59 BPM

## 2025-05-03 PROCEDURE — 93010 ELECTROCARDIOGRAM REPORT: CPT | Performed by: INTERNAL MEDICINE

## 2025-05-03 NOTE — ED ATTENDING ATTESTATION
5/2/2025  I, Marnie Johnson MD, saw and evaluated the patient. I have discussed the patient with the resident/non-physician practitioner and agree with the resident's/non-physician practitioner's findings, Plan of Care, and MDM as documented in the resident's/non-physician practitioner's note, except where noted. All available labs and Radiology studies were reviewed.  I was present for key portions of any procedure(s) performed by the resident/non-physician practitioner and I was immediately available to provide assistance.       At this point I agree with the current assessment done in the Emergency Department.  I have conducted an independent evaluation of this patient a history and physical is as follows:      25 yo female with h/o Von Willebrand Disease presents from infusion center with allergic reaction.  Pt reports that she was receiving iron infusion (for the second time) and became flushed, and broke out with hives.  No SOB, swelling of lips/tongue/throat/voice change.  Did report nausea, no vomiting.  Upon initial eval scattered urticaria noted to extremities and torso.  Clear lungs, normal phonation, handling secretions, no WOB or respiratory distress.  Vitals and EKG reassuring.  Treated with benadryl, steroids, pepcid with improvement of symptoms.  Observed at length without rebound.  Pt felt comfortable with discharge to home.  Small localized area of rash persisted on volar left arm near antecub near BP cuff.   Cortisone cream applied.  Pt to f/u with allergy, hold off on further iron infusions until discussed with them, Epi pen as needed.  RTER precautions discussed and documented on discharge paperwork, pt and family endorsed good understanding of reasons to return.            Past Medical History:   Diagnosis Date    Less than 8 weeks gestation of pregnancy 02/15/2024    Varicella     immunized    Von Willebrand disease (HCC)          ED Course  ED Course as of 05/02/25 0591   Fri May 02, 2025    2102 XR chest 1 view portable  IMPRESSION:     No acute cardiopulmonary disease.           Workstation performed: [id number]         Exam Ended: 05/02/25 17:50             Critical Care Time  Procedures

## 2025-05-04 ENCOUNTER — TELEPHONE (OUTPATIENT)
Dept: OTHER | Facility: OTHER | Age: 25
End: 2025-05-04

## 2025-05-04 RX ORDER — EPINEPHRINE 0.3 MG/.3ML
0.3 INJECTION SUBCUTANEOUS ONCE
Qty: 0.6 ML | Refills: 0 | Status: SHIPPED | OUTPATIENT
Start: 2025-05-04 | End: 2025-05-06

## 2025-05-05 ENCOUNTER — TELEPHONE (OUTPATIENT)
Dept: INFUSION CENTER | Facility: CLINIC | Age: 25
End: 2025-05-05

## 2025-05-05 NOTE — TELEPHONE ENCOUNTER
Patient called to cancel all infusions due to reaction to her infusion. Cancelled appts. Wants to speak with provider about other options for tx.

## 2025-05-06 ENCOUNTER — OFFICE VISIT (OUTPATIENT)
Dept: FAMILY MEDICINE CLINIC | Facility: CLINIC | Age: 25
End: 2025-05-06
Payer: COMMERCIAL

## 2025-05-06 VITALS
OXYGEN SATURATION: 95 % | HEIGHT: 61 IN | HEART RATE: 112 BPM | TEMPERATURE: 98.4 F | RESPIRATION RATE: 16 BRPM | BODY MASS INDEX: 21.14 KG/M2 | DIASTOLIC BLOOD PRESSURE: 50 MMHG | WEIGHT: 112 LBS | SYSTOLIC BLOOD PRESSURE: 86 MMHG

## 2025-05-06 DIAGNOSIS — R00.0 TACHYCARDIA: ICD-10-CM

## 2025-05-06 DIAGNOSIS — T78.40XA ACUTE ALLERGIC REACTION, INITIAL ENCOUNTER: ICD-10-CM

## 2025-05-06 DIAGNOSIS — F17.200 SMOKER: ICD-10-CM

## 2025-05-06 DIAGNOSIS — I95.9 HYPOTENSION, UNSPECIFIED HYPOTENSION TYPE: ICD-10-CM

## 2025-05-06 DIAGNOSIS — R11.2 NAUSEA AND VOMITING, UNSPECIFIED VOMITING TYPE: ICD-10-CM

## 2025-05-06 DIAGNOSIS — R11.0 NAUSEA: ICD-10-CM

## 2025-05-06 DIAGNOSIS — R30.0 DYSURIA: ICD-10-CM

## 2025-05-06 DIAGNOSIS — D50.0 IRON DEFICIENCY ANEMIA DUE TO CHRONIC BLOOD LOSS: ICD-10-CM

## 2025-05-06 DIAGNOSIS — L50.9 URTICARIA: Primary | ICD-10-CM

## 2025-05-06 PROCEDURE — 99214 OFFICE O/P EST MOD 30 MIN: CPT | Performed by: FAMILY MEDICINE

## 2025-05-06 RX ORDER — ONDANSETRON 8 MG/1
8 TABLET, ORALLY DISINTEGRATING ORAL EVERY 8 HOURS PRN
Qty: 30 TABLET | Refills: 0 | Status: SHIPPED | OUTPATIENT
Start: 2025-05-06 | End: 2025-05-16

## 2025-05-06 NOTE — Clinical Note
Did you call pt to ask her to submit urine sample at lab because she could not give sample in office?  If so, please document or do so.

## 2025-05-06 NOTE — PROGRESS NOTES
Assessment/Plan:  Assessment & Plan  Urticaria  Resolved.  Suspect allergic reaction to IV Iron.  Patient to discuss c Heme/ Onc if Pre-infusion Benadryl and steroids are advised.         Acute allergic reaction, initial encounter  Resolved.  Suspect allergic reaction to IV Iron.  Patient to discuss c Heme/ Onc if Pre-infusion Benadryl and steroids are advised.         Nausea and vomiting, unspecified vomiting type  Suspect due to Prednisone - Rx finished.  Advise Zofran ODT 8mg q8 hours PRN - side effects discussed.  Push fluids, BRAT diet.      Orders:    ondansetron (ZOFRAN-ODT) 8 mg disintegrating tablet; Take 1 tablet (8 mg total) by mouth every 8 (eight) hours as needed for nausea or vomiting for up to 10 days    Dysuria  Pending labs.  Patient unable to provide urine sample in office today.      Orders:    Urine culture    Urinalysis with microscopic    Iron deficiency anemia due to chronic blood loss  Management per Heme / Onc.  See above.           Hypotension, unspecified hypotension type  Suspect due to nausea s/p Prednisone.  Push fluids, increase dietary salt.  To ER c worsening symptoms.         Tachycardia  Due to acute illness.  Montior.         Nausea  Suspect due to Prednisone - Rx finished.  Advise Zofran ODT 8mg q8 hours PRN - side effects discussed.  Push fluids, BRAT diet.      Orders:    ondansetron (ZOFRAN-ODT) 8 mg disintegrating tablet; Take 1 tablet (8 mg total) by mouth every 8 (eight) hours as needed for nausea or vomiting for up to 10 days    Smoker  Contemplative.  Smoking cessation advised.            Return if symptoms worsen or fail to improve.      Future Appointments   Date Time Provider Department Center   5/13/2025  1:20 PM Phil Ortiz MD HEM ONC [facility]   6/18/2025 11:00 AM Alondra Peralta DO [facility]   7/1/2025  8:40 AM Nazario Holloway MD CARD [facility]   7/17/2025  1:30 PM Bessie Ortiz MD [facility]   4/21/2026 12:40 PM Debi  DO Jad HEM ONC [facility]        Subjective:     Cece is a 24 y.o. female who presents today for a follow-up on her acute medical conditions.        HPI:  Chief Complaint   Patient presents with    allergic reaction to infusion     -- Above per clinical staff and reviewed. --    Shortness of Breath  The current episode started yesterday. The problem occurs every few minutes. The problem has been gradually worsening since onset. Associated symptoms include fatigue, leg swelling (R medial knee), orthopnea and rhinorrhea. Pertinent negatives include no chest pain, sore throat or wheezing. The symptoms are aggravated by any activity.         Today:      F/U ER [facility] 5/2/25     Urticaria - Patient presented to ER from infusion center s/p Iron Infusion managed per Heme/Onc (next appt 5/13/25) for Iron Deficiency Anemia due to chronic blood loss from heavy menses due to generalized hives, left leg numbness, jaw discomfort, and throat scratchiness.  Oral iron previously caused constipation.  See ER administered and D/C meds below.  She was referred to Allergist - pending consult c Dr. Ortiz 7/25.  She finished Prednisone 40mg QD x 4 days on Monday, 5/5/25.  Still taking Pepcid 20mg QD through Friday, 5/9/25.  She has not needed to use EpiPen. Stable Chest Xray and EKG.  Hives have resolved.      2.  Acute allergic reaction, initial encounter - See above.          ECG 12 lead  ED interpretation: Sinus bradycardia, 59 bpm.  ND interval 132 ms, QRS 88 ms, QTc 397 ms.  Normal axis.  No ST elevations, depressions or ischemic changes noted.  No significant changes from 2/15/2024.       Pending Cardio consult Dr. Holloway 7/25 for sinus bradycardia.  Recent HR per vitals 4/25 - 5/25 - 65-84.      Care Timeline    1700   Arrived   1732   methylPREDNISolone Sodium Succ 125 mg   1736   Ondansetron HCl 4 mg     ECG 12 lead   1737   diphenhydrAMINE HCl 50 mg   1739   Famotidine 20 mg   1750   XR chest 1  view portable   1900   Sodium Chloride 1000 mL   2155   Hydrocortisone No dose recorded   2227   Discharged       D/C Meds -     Medication Changes      EPINEPHrine 0.3 mg Intramuscular Once    Famotidine 20 mg Oral Daily    predniSONE      20 mg Oral 2 times daily    40 mg Oral Daily    The following portions of the patient's history were reviewed and updated as appropriate: allergies, current medications, past family history, past medical history, past social history, past surgical history and problem list.      Review of Systems   Constitutional:  Positive for chills and fatigue. Negative for diaphoresis and fever.   HENT:  Positive for rhinorrhea. Negative for ear pain and sore throat.    Respiratory:  Negative for shortness of breath and wheezing.    Cardiovascular:  Positive for orthopnea and leg swelling (R medial knee). Negative for chest pain.   Gastrointestinal:  Positive for constipation, nausea and vomiting (Resolved). Negative for abdominal pain and blood in stool.   Genitourinary:  Positive for dysuria (x today) and vaginal discharge (First 2 days of contraceptive patch). Negative for flank pain, frequency, hematuria and urgency.   Musculoskeletal:  Negative for neck pain.   Skin:  Positive for rash.   Neurological:  Positive for headaches.        Current Outpatient Medications   Medication Sig Dispense Refill    famotidine (PEPCID) 20 mg tablet Take 1 tablet (20 mg total) by mouth daily for 7 days 7 tablet 0    norelgestromin-ethinyl estradiol (ORTHO EVRA) 150-35 MCG/24HR Place 1 patch on the skin over 7 days once a week 12 patch 4    ondansetron (ZOFRAN-ODT) 8 mg disintegrating tablet Take 1 tablet (8 mg total) by mouth every 8 (eight) hours as needed for nausea or vomiting for up to 10 days 30 tablet 0    SUMAtriptan (IMITREX) 100 mg tablet Take 1 tablet (100 mg total) by mouth once as needed for migraine for up to 1 dose may repeat in 2 hours if necessary. Max dose 200mg in 24 hour period. 10  "tablet 5    EPINEPHrine (EPIPEN) 0.3 mg/0.3 mL SOAJ Inject 0.3 mL (0.3 mg total) into a muscle once for 1 dose (Patient not taking: Reported on 5/6/2025) 0.6 mL 0     No current facility-administered medications for this visit.       Objective:  BP (!) 86/50 (BP Location: Left arm, Patient Position: Sitting, Cuff Size: Standard)   Pulse (!) 112   Temp 98.4 °F (36.9 °C) (Tympanic)   Resp 16   Ht 5' 1\" (1.549 m)   Wt 50.8 kg (112 lb)   LMP 04/15/2025 (Approximate)   SpO2 95%   Breastfeeding No   BMI 21.16 kg/m²    Wt Readings from Last 3 Encounters:   05/06/25 50.8 kg (112 lb)   04/08/25 51.3 kg (113 lb)   04/03/25 51.3 kg (113 lb)      BP Readings from Last 3 Encounters:   05/06/25 (!) 86/50   05/02/25 104/55   05/02/25 94/58          Physical Exam  Vitals and nursing note reviewed.   Constitutional:       Appearance: Normal appearance. She is well-developed and normal weight.   HENT:      Head: Normocephalic and atraumatic.   Eyes:      Conjunctiva/sclera: Conjunctivae normal.   Neck:      Thyroid: No thyromegaly.   Cardiovascular:      Rate and Rhythm: Normal rate and regular rhythm.      Heart sounds: Normal heart sounds.   Pulmonary:      Effort: Pulmonary effort is normal.      Breath sounds: Normal breath sounds.   Abdominal:      General: Abdomen is flat. Bowel sounds are normal. There is no distension.      Palpations: Abdomen is soft. There is no mass.      Tenderness: There is no abdominal tenderness. There is no right CVA tenderness, left CVA tenderness, guarding or rebound.   Musculoskeletal:         General: No swelling.      Cervical back: Neck supple.      Right lower leg: No edema.      Left lower leg: No edema.   Neurological:      General: No focal deficit present.      Mental Status: She is alert and oriented to person, place, and time.   Psychiatric:         Mood and Affect: Mood normal.         Lab Results:      Lab Results   Component Value Date    WBC 7.23 04/12/2025    HGB 15.4 " "04/12/2025    HCT 46.3 (H) 04/12/2025     04/12/2025    TRIG 85 01/10/2024    HDL 52 01/10/2024    ALT 7 02/15/2024    AST 12 (L) 02/15/2024    K 3.4 (L) 02/15/2024     02/15/2024    CREATININE 0.50 (L) 02/15/2024    BUN 10 02/15/2024    CO2 19 (L) 02/15/2024    INR 1.07 02/25/2022    GLUF 81 01/10/2024     No results found for: \"URICACID\"  Invalid input(s): \"BASENAME\" Vitamin D    XR chest 1 view portable  Result Date: 5/2/2025  Narrative: XR CHEST PORTABLE INDICATION: allergic reaction. COMPARISON: None FINDINGS: Clear lungs. No pneumothorax or pleural effusion. Normal cardiomediastinal silhouette. Bones are unremarkable for age. No free air in the upper abdomen.     Impression: No acute cardiopulmonary disease. Workstation performed: [id number]        POCT Labs                       Answers submitted by the patient for this visit:  Shortness of Breath Questionnaire (Submitted on 5/3/2025)  Chief Complaint: Shortness of breath  Chronicity: new  Episode duration: 15 Minutes  hemoptysis: No  sputum production: No  PND: Yes  syncope: No  claudication: Yes  leg pain: Yes  coryza: Yes  swollen glands: No    "

## 2025-05-08 ENCOUNTER — HOSPITAL ENCOUNTER (EMERGENCY)
Facility: HOSPITAL | Age: 25
Discharge: HOME | End: 2025-05-09
Attending: EMERGENCY MEDICINE
Payer: COMMERCIAL

## 2025-05-08 ENCOUNTER — APPOINTMENT (EMERGENCY)
Dept: CT IMAGING | Facility: HOSPITAL | Age: 25
End: 2025-05-08
Payer: COMMERCIAL

## 2025-05-08 VITALS
RESPIRATION RATE: 18 BRPM | SYSTOLIC BLOOD PRESSURE: 114 MMHG | TEMPERATURE: 97.9 F | HEART RATE: 76 BPM | OXYGEN SATURATION: 99 % | DIASTOLIC BLOOD PRESSURE: 56 MMHG

## 2025-05-08 DIAGNOSIS — R11.2 NAUSEA AND VOMITING, UNSPECIFIED VOMITING TYPE: ICD-10-CM

## 2025-05-08 DIAGNOSIS — K59.00 CONSTIPATION, UNSPECIFIED CONSTIPATION TYPE: Primary | ICD-10-CM

## 2025-05-08 LAB
ALBUMIN SERPL BCG-MCNC: 4 G/DL (ref 3.5–5)
ALP SERPL-CCNC: 29 U/L (ref 34–104)
ALT SERPL W P-5'-P-CCNC: 13 U/L (ref 7–52)
ANION GAP SERPL CALCULATED.3IONS-SCNC: 9 MMOL/L (ref 4–13)
AST SERPL W P-5'-P-CCNC: 18 U/L (ref 13–39)
BACTERIA UR QL AUTO: ABNORMAL /HPF
BASOPHILS # BLD AUTO: 0.02 THOUSANDS/ÂΜL (ref 0–0.1)
BASOPHILS NFR BLD AUTO: 0 % (ref 0–1)
BILIRUB SERPL-MCNC: 0.4 MG/DL (ref 0.2–1)
BILIRUB UR QL STRIP: NEGATIVE
BUN SERPL-MCNC: 12 MG/DL (ref 5–25)
CALCIUM SERPL-MCNC: 8.6 MG/DL (ref 8.4–10.2)
CHLORIDE SERPL-SCNC: 103 MMOL/L (ref 96–108)
CLARITY UR: CLEAR
CO2 SERPL-SCNC: 23 MMOL/L (ref 21–32)
COLOR UR: YELLOW
CREAT SERPL-MCNC: 0.55 MG/DL (ref 0.6–1.3)
EOSINOPHIL # BLD AUTO: 0.14 THOUSAND/ÂΜL (ref 0–0.61)
EOSINOPHIL NFR BLD AUTO: 2 % (ref 0–6)
ERYTHROCYTE [DISTWIDTH] IN BLOOD BY AUTOMATED COUNT: 11.9 % (ref 11.6–15.1)
EXT PREGNANCY TEST URINE: NEGATIVE
EXT. CONTROL: NORMAL
GFR SERPL CREATININE-BSD FRML MDRD: 132 ML/MIN/1.73SQ M
GLUCOSE SERPL-MCNC: 102 MG/DL (ref 65–140)
GLUCOSE UR STRIP-MCNC: NEGATIVE MG/DL
HCT VFR BLD AUTO: 38.4 % (ref 34.8–46.1)
HGB BLD-MCNC: 13.4 G/DL (ref 11.5–15.4)
HGB UR QL STRIP.AUTO: ABNORMAL
IMM GRANULOCYTES # BLD AUTO: 0.02 THOUSAND/UL (ref 0–0.2)
IMM GRANULOCYTES NFR BLD AUTO: 0 % (ref 0–2)
KETONES UR STRIP-MCNC: NEGATIVE MG/DL
LEUKOCYTE ESTERASE UR QL STRIP: NEGATIVE
LIPASE SERPL-CCNC: 15 U/L (ref 11–82)
LYMPHOCYTES # BLD AUTO: 1.19 THOUSANDS/ÂΜL (ref 0.6–4.47)
LYMPHOCYTES NFR BLD AUTO: 20 % (ref 14–44)
MCH RBC QN AUTO: 32.1 PG (ref 26.8–34.3)
MCHC RBC AUTO-ENTMCNC: 34.9 G/DL (ref 31.4–37.4)
MCV RBC AUTO: 92 FL (ref 82–98)
MONOCYTES # BLD AUTO: 0.49 THOUSAND/ÂΜL (ref 0.17–1.22)
MONOCYTES NFR BLD AUTO: 8 % (ref 4–12)
MUCOUS THREADS UR QL AUTO: ABNORMAL
NEUTROPHILS # BLD AUTO: 4.25 THOUSANDS/ÂΜL (ref 1.85–7.62)
NEUTS SEG NFR BLD AUTO: 70 % (ref 43–75)
NITRITE UR QL STRIP: NEGATIVE
NON-SQ EPI CELLS URNS QL MICRO: ABNORMAL /HPF
NRBC BLD AUTO-RTO: 0 /100 WBCS
PH UR STRIP.AUTO: 7 [PH]
PLATELET # BLD AUTO: 215 THOUSANDS/UL (ref 149–390)
PMV BLD AUTO: 11.8 FL (ref 8.9–12.7)
POTASSIUM SERPL-SCNC: 3.5 MMOL/L (ref 3.5–5.3)
PROT SERPL-MCNC: 6.4 G/DL (ref 6.4–8.4)
PROT UR STRIP-MCNC: ABNORMAL MG/DL
RBC # BLD AUTO: 4.18 MILLION/UL (ref 3.81–5.12)
RBC #/AREA URNS AUTO: ABNORMAL /HPF
SODIUM SERPL-SCNC: 135 MMOL/L (ref 135–147)
SP GR UR STRIP.AUTO: 1.02 (ref 1–1.03)
UROBILINOGEN UR STRIP-ACNC: 3 MG/DL
WBC # BLD AUTO: 6.11 THOUSAND/UL (ref 4.31–10.16)
WBC #/AREA URNS AUTO: ABNORMAL /HPF

## 2025-05-08 PROCEDURE — 80053 COMPREHEN METABOLIC PANEL: CPT

## 2025-05-08 PROCEDURE — 96374 THER/PROPH/DIAG INJ IV PUSH: CPT

## 2025-05-08 PROCEDURE — 85025 COMPLETE CBC W/AUTO DIFF WBC: CPT

## 2025-05-08 PROCEDURE — 36415 COLL VENOUS BLD VENIPUNCTURE: CPT

## 2025-05-08 PROCEDURE — 93005 ELECTROCARDIOGRAM TRACING: CPT

## 2025-05-08 PROCEDURE — 74177 CT ABD & PELVIS W/CONTRAST: CPT

## 2025-05-08 PROCEDURE — 99284 EMERGENCY DEPT VISIT MOD MDM: CPT

## 2025-05-08 PROCEDURE — 81001 URINALYSIS AUTO W/SCOPE: CPT

## 2025-05-08 PROCEDURE — 96361 HYDRATE IV INFUSION ADD-ON: CPT

## 2025-05-08 PROCEDURE — 83690 ASSAY OF LIPASE: CPT

## 2025-05-08 PROCEDURE — 99285 EMERGENCY DEPT VISIT HI MDM: CPT | Performed by: EMERGENCY MEDICINE

## 2025-05-08 PROCEDURE — 81025 URINE PREGNANCY TEST: CPT

## 2025-05-08 RX ORDER — ONDANSETRON 2 MG/ML
4 INJECTION INTRAMUSCULAR; INTRAVENOUS ONCE
Status: COMPLETED | OUTPATIENT
Start: 2025-05-08 | End: 2025-05-08

## 2025-05-08 RX ADMIN — SODIUM CHLORIDE 1000 ML: 0.9 INJECTION, SOLUTION INTRAVENOUS at 21:32

## 2025-05-08 RX ADMIN — IOHEXOL 85 ML: 350 INJECTION, SOLUTION INTRAVENOUS at 22:15

## 2025-05-08 RX ADMIN — ONDANSETRON 4 MG: 2 INJECTION, SOLUTION INTRAMUSCULAR; INTRAVENOUS at 21:32

## 2025-05-09 LAB
ATRIAL RATE: 79 BPM
P AXIS: 69 DEGREES
PR INTERVAL: 132 MS
QRS AXIS: 88 DEGREES
QRSD INTERVAL: 94 MS
QT INTERVAL: 372 MS
QTC INTERVAL: 426 MS
T WAVE AXIS: 59 DEGREES
VENTRICULAR RATE: 79 BPM

## 2025-05-09 PROCEDURE — 93010 ELECTROCARDIOGRAM REPORT: CPT | Performed by: STUDENT IN AN ORGANIZED HEALTH CARE EDUCATION/TRAINING PROGRAM

## 2025-05-09 RX ORDER — DOCUSATE SODIUM 100 MG/1
100 CAPSULE, LIQUID FILLED ORAL DAILY
Qty: 5 CAPSULE | Refills: 0 | Status: SHIPPED | OUTPATIENT
Start: 2025-05-09 | End: 2025-05-14

## 2025-05-09 RX ORDER — ONDANSETRON 4 MG/1
4 TABLET, ORALLY DISINTEGRATING ORAL EVERY 6 HOURS PRN
Qty: 20 TABLET | Refills: 0 | Status: SHIPPED | OUTPATIENT
Start: 2025-05-09 | End: 2025-05-14

## 2025-05-09 RX ORDER — POLYETHYLENE GLYCOL 3350 17 G/17G
17 POWDER, FOR SOLUTION ORAL DAILY
Qty: 85 G | Refills: 0 | Status: SHIPPED | OUTPATIENT
Start: 2025-05-09 | End: 2025-05-14

## 2025-05-09 NOTE — ED PROVIDER NOTES
Time reflects when diagnosis was documented in both MDM as applicable and the Disposition within this note       Time User Action Codes Description Comment    2025 12:10 AM SajiTalha Manuel [K59.00] Constipation, unspecified constipation type     2025 12:34 AM Saji Talha Jackson [R11.2] Nausea and vomiting, unspecified vomiting type           ED Disposition       ED Disposition   Discharge    Condition   Stable    Date/Time   Fri May 9, 2025 12:19 AM    Comment   Cece Butler discharge to home/self care.                   Assessment & Plan       Medical Decision Making  Cece Butler is a 24 y.o. female with a past medical history of von Willebrand disease and surgical hx of  being evaluated for lower abdominal pain.     Differential diagnoses include but not limited to: Viral gastroenteritis, small bowel obstruction, pancreatitis, urinary tract infection, pyelonephritis, constipation, electrolyte abnormality    Initial workup to include: CBC, CMP, lipase, UA, urine pregnancy test, CT abdomen pelvis    See ED Course for data/imaging interpretation and further MDM.    Disposition: Lab work in ED unremarkable.  UA without evidence of infection.  Urine pregnancy test negative.  CT abdomen and pelvis with large stool burden but no evidence of obstruction.  Patient given prescription for MiraLAX and to Colace bowel regimen in addition to zofran for nausea and instructed to stay well-hydrated.  Return precautions discussed patient discharged home in stable condition.      Amount and/or Complexity of Data Reviewed  Labs: ordered. Decision-making details documented in ED Course.  Radiology: ordered.    Risk  OTC drugs.  Prescription drug management.        ED Course as of 25 0039   u May 08, 2025   2124 Patient declines pain medicine on initial evaluation   2149 CBC and differential  Within normal limits, no concerning values   2203 Comprehensive metabolic panel(!)  Within normal limits, no  concerning values.  No electrolyte abnormalities.  Appropriate kidney function   2203 LIPASE: 15  Within normal limits, low suspicion for pancreatitis at this time   2204 PREGNANCY TEST URINE: Negative   2216 UA w Reflex to Microscopic w Reflex to Culture(!)  Negative leukocytes, nitrites.  Low suspicion for urinary tract infection at this time   Fri May 09, 2025   0013 Exam: CT Abdomen And Pelvis With Contrast Exam date and time: 5/8/2025 10:11 PM Age: 24 years old Clinical indication: Abdominal pain; R/O obstruction TECHNIQUE: Imaging protocol: Computed tomography of the abdomen and pelvis with contrast. COMPARISON: No relevant prior studies available. FINDINGS: Liver: Normal. No mass. Gallbladder and biliary ducts: Normal. No calcified stones. No ductal dilation. Pancreas: Normal. No ductal dilation. Spleen: Normal. No splenomegaly. Adrenal glands: Normal. No mass. Kidneys and ureters: Normal. No hydronephrosis. Stomach and bowel: There is mild fluid distension of portions of small bowel in the left upper quadrant which remains within normal limits caliber. No obstructive lesion or masses identified. There is a large amount of stool throughout the colon. Appendix: No evidence of appendicitis. Intraperitoneal space: Unremarkable. No free air. No significant fluid collection. Vasculature: Unremarkable. No abdominal aortic aneurysm. Lymph nodes: Unremarkable. No enlarged lymph nodes. Urinary bladder: Unremarkable as visualized. Reproductive: Unremarkable as visualized. Bones/joints: Unremarkable. No acute fracture. Soft tissues: Unremarkable. IMPRESSION: There is mild fluid distension of portions of small bowel in the left upper quadrant which remains within normal limits caliber. No obstructive lesion or masses identified. There is a large amount of stool throughout the colon. Query ileus/constipation. Consider follow-up imaging to document resolution.      0016 Vrad  CT abdomen pelvis read consistent with  constipation.  No signs of bowel obstruction       Medications   sodium chloride 0.9 % bolus 1,000 mL (0 mL Intravenous Stopped 25 0026)   ondansetron (ZOFRAN) injection 4 mg (4 mg Intravenous Given 25)   iohexol (OMNIPAQUE) 350 MG/ML injection (MULTI-DOSE) 85 mL (85 mL Intravenous Given 255)       ED Risk Strat Scores                    No data recorded                            History of Present Illness       Chief Complaint   Patient presents with    Abdominal Pain     Pt reports abdominal pain since last Thursday. Vomiting since Monday. Reports increased gas and constipation.        Past Medical History:   Diagnosis Date    Less than 8 weeks gestation of pregnancy 02/15/2024    Varicella     immunized    Von Willebrand disease (HCC)       Past Surgical History:   Procedure Laterality Date     SECTION  22    [place]  DELIVERY ONLY N/A 2022    Procedure:  SECTION ();  Surgeon: Didi Almazan MD;  Location: [facility];  Service: Obstetrics      Family History   Problem Relation Age of Onset    No Known Problems Mother     COPD Father     No Known Problems Sister     No Known Problems Maternal Grandmother     Supraventricular tachycardia Maternal Grandfather     No Known Problems Paternal Grandmother     Prostate cancer Paternal Grandfather     COPD Paternal Grandfather       Social History     Tobacco Use    Smoking status: Some Days     Passive exposure: Never    Smokeless tobacco: Never   Vaping Use    Vaping status: Former   Substance Use Topics    Alcohol use: Never    Drug use: Never      E-Cigarette/Vaping    E-Cigarette Use Former User       E-Cigarette/Vaping Substances    Nicotine No     THC No     CBD No     Flavoring No     Other No     Unknown No       I have reviewed and agree with the history as documented.     Cece Butler is a 24 y.o. female with a past medical history of von Willebrand disease and surgical hx of  being  evaluated for lower abdominal pain.  Patient states that symptoms began 3 days ago with lower abdominal pain, nausea, vomiting.  Patient reports 12-14 episodes of nonbloody, nonbilious emesis.  Patient states that she has not had bowel movement since Monday but is still passing flatus.  Reports prior history of constipation but is not on any current bowel regimen.  Denies any dysuria, urinary urgency or frequency, hematuria.            Abdominal Pain  Associated symptoms: constipation, nausea and vomiting        Review of Systems   Gastrointestinal:  Positive for abdominal pain, constipation, nausea and vomiting.   All other systems reviewed and are negative.          Objective       ED Triage Vitals [05/08/25 2029]   Temperature Pulse Blood Pressure Respirations SpO2 Patient Position - Orthostatic VS   97.9 °F (36.6 °C) 86 118/69 18 96 % --      Temp Source Heart Rate Source BP Location FiO2 (%) Pain Score    Oral Monitor -- -- --      Vitals      Date and Time Temp Pulse SpO2 Resp BP Pain Score FACES Pain Rating User   05/08/25 2238 -- 76 99 % -- 114/56 -- --    05/08/25 2224 -- 79 99 % -- 117/76 -- --    05/08/25 2038 -- 79 98 % -- 118/69 -- --    05/08/25 2029 97.9 °F (36.6 °C) 86 96 % 18 118/69 -- -- KRISTIE            Physical Exam  Vitals and nursing note reviewed.   Constitutional:       General: She is not in acute distress.     Appearance: She is well-developed. She is not ill-appearing.   HENT:      Head: Normocephalic and atraumatic.   Eyes:      Conjunctiva/sclera: Conjunctivae normal.   Cardiovascular:      Rate and Rhythm: Normal rate and regular rhythm.      Heart sounds: Normal heart sounds. No murmur heard.  Pulmonary:      Effort: Pulmonary effort is normal. No respiratory distress.      Breath sounds: Normal breath sounds. No wheezing, rhonchi or rales.   Abdominal:      General: Abdomen is flat. Bowel sounds are normal. There is no distension.      Palpations: Abdomen is soft.      Tenderness:  There is abdominal tenderness (Suprapubic) in the periumbilical area and suprapubic area. There is no guarding or rebound.   Musculoskeletal:         General: No swelling.      Cervical back: Neck supple.   Skin:     General: Skin is warm and dry.      Capillary Refill: Capillary refill takes less than 2 seconds.   Neurological:      Mental Status: She is alert.   Psychiatric:         Mood and Affect: Mood normal.         Results Reviewed       Procedure Component Value Units Date/Time    Urine Microscopic [[id number]]  (Abnormal) Collected: 05/08/25 2158    Lab Status: Final result Specimen: Urine, Clean Catch Updated: 05/08/25 2220     RBC, UA 10-20 /hpf      WBC, UA None Seen /hpf      Epithelial Cells Occasional /hpf      Bacteria, UA Occasional /hpf      MUCUS THREADS Occasional    UA w Reflex to Microscopic w Reflex to Culture [[id number]]  (Abnormal) Collected: 05/08/25 2158    Lab Status: Final result Specimen: Urine, Clean Catch Updated: 05/08/25 2212     Color, UA Yellow     Clarity, UA Clear     Specific Gravity, UA 1.023     pH, UA 7.0     Leukocytes, UA Negative     Nitrite, UA Negative     Protein, UA Trace mg/dl      Glucose, UA Negative mg/dl      Ketones, UA Negative mg/dl      Urobilinogen, UA 3.0 mg/dl      Bilirubin, UA Negative     Occult Blood, UA Small    Comprehensive metabolic panel [[id number]]  (Abnormal) Collected: 05/08/25 2135    Lab Status: Final result Specimen: Blood from Arm, Left Updated: 05/08/25 2202     Sodium 135 mmol/L      Potassium 3.5 mmol/L      Chloride 103 mmol/L      CO2 23 mmol/L      ANION GAP 9 mmol/L      BUN 12 mg/dL      Creatinine 0.55 mg/dL      Glucose 102 mg/dL      Calcium 8.6 mg/dL      AST 18 U/L      ALT 13 U/L      Alkaline Phosphatase 29 U/L      Total Protein 6.4 g/dL      Albumin 4.0 g/dL      Total Bilirubin 0.40 mg/dL      eGFR 132 ml/min/1.73sq m     Narrative:      National Kidney Disease Foundation guidelines for Chronic Kidney Disease (CKD):      Stage 1 with normal or high GFR (GFR > 90 mL/min/1.73 square meters)    Stage 2 Mild CKD (GFR = 60-89 mL/min/1.73 square meters)    Stage 3A Moderate CKD (GFR = 45-59 mL/min/1.73 square meters)    Stage 3B Moderate CKD (GFR = 30-44 mL/min/1.73 square meters)    Stage 4 Severe CKD (GFR = 15-29 mL/min/1.73 square meters)    Stage 5 End Stage CKD (GFR <15 mL/min/1.73 square meters)  Note: GFR calculation is accurate only with a steady state creatinine    Lipase [[id number]]  (Normal) Collected: 05/08/25 2135    Lab Status: Final result Specimen: Blood from Arm, Left Updated: 05/08/25 2202     Lipase 15 u/L     POCT pregnancy, urine [[id number]]  (Normal) Collected: 05/08/25 2201    Lab Status: Final result Updated: 05/08/25 2201     EXT Preg Test, Ur Negative     Control Valid    CBC and differential [[id number]] Collected: 05/08/25 2136    Lab Status: Final result Specimen: Blood from Arm, Left Updated: 05/08/25 2146     WBC 6.11 Thousand/uL      RBC 4.18 Million/uL      Hemoglobin 13.4 g/dL      Hematocrit 38.4 %      MCV 92 fL      MCH 32.1 pg      MCHC 34.9 g/dL      RDW 11.9 %      MPV 11.8 fL      Platelets 215 Thousands/uL      nRBC 0 /100 WBCs      Segmented % 70 %      Immature Grans % 0 %      Lymphocytes % 20 %      Monocytes % 8 %      Eosinophils Relative 2 %      Basophils Relative 0 %      Absolute Neutrophils 4.25 Thousands/µL      Absolute Immature Grans 0.02 Thousand/uL      Absolute Lymphocytes 1.19 Thousands/µL      Absolute Monocytes 0.49 Thousand/µL      Eosinophils Absolute 0.14 Thousand/µL      Basophils Absolute 0.02 Thousands/µL             CT abdomen pelvis with contrast    (Results Pending)       ECG 12 Lead Documentation Only    Date/Time: 5/9/2025 12:38 AM    Performed by: Talha Lennon DO  Authorized by: Talha Lennon DO    ECG reviewed by me, the ED Provider: yes    Patient location:  ED  Previous ECG:     Previous ECG:  Compared to current    Similarity:  No change  Interpretation:      Interpretation: normal    Rate:     ECG rate assessment: normal    Rhythm:     Rhythm: sinus rhythm    Ectopy:     Ectopy: none    QRS:     QRS axis:  Normal    QRS intervals:  Normal  Conduction:     Conduction: normal    ST segments:     ST segments:  Normal  T waves:     T waves: normal        ED Medication and Procedure Management   Prior to Admission Medications   Prescriptions Last Dose Informant Patient Reported? Taking?   EPINEPHrine (EPIPEN) 0.3 mg/0.3 mL SOAJ   No No   Sig: Inject 0.3 mL (0.3 mg total) into a muscle once for 1 dose   Patient not taking: Reported on 5/6/2025   SUMAtriptan (IMITREX) 100 mg tablet  Self No No   Sig: Take 1 tablet (100 mg total) by mouth once as needed for migraine for up to 1 dose may repeat in 2 hours if necessary. Max dose 200mg in 24 hour period.   famotidine (PEPCID) 20 mg tablet   No No   Sig: Take 1 tablet (20 mg total) by mouth daily for 7 days   norelgestromin-ethinyl estradiol (ORTHO EVRA) 150-35 MCG/24HR   No No   Sig: Place 1 patch on the skin over 7 days once a week   ondansetron (ZOFRAN-ODT) 8 mg disintegrating tablet   No No   Sig: Take 1 tablet (8 mg total) by mouth every 8 (eight) hours as needed for nausea or vomiting for up to 10 days      Facility-Administered Medications: None     Discharge Medication List as of 5/9/2025 12:19 AM        START taking these medications    Details   docusate sodium (COLACE) 100 mg capsule Take 1 capsule (100 mg total) by mouth in the morning for 5 days, Starting Fri 5/9/2025, Until Wed 5/14/2025, Normal      polyethylene glycol (MIRALAX) 17 g packet Take 17 g by mouth daily for 5 days, Starting Fri 5/9/2025, Until Wed 5/14/2025, Normal           CONTINUE these medications which have NOT CHANGED    Details   EPINEPHrine (EPIPEN) 0.3 mg/0.3 mL SOAJ Inject 0.3 mL (0.3 mg total) into a muscle once for 1 dose, Starting Sun 5/4/2025, Normal      famotidine (PEPCID) 20 mg tablet Take 1 tablet (20 mg total) by mouth daily for 7 days,  Starting Fri 5/2/2025, Until Fri 5/9/2025, Normal      norelgestromin-ethinyl estradiol (ORTHO EVRA) 150-35 MCG/24HR Place 1 patch on the skin over 7 days once a week, Starting Tue 4/15/2025, Until Mon 7/14/2025, Normal      ondansetron (ZOFRAN-ODT) 8 mg disintegrating tablet Take 1 tablet (8 mg total) by mouth every 8 (eight) hours as needed for nausea or vomiting for up to 10 days, Starting Tue 5/6/2025, Until Fri 5/16/2025 at 2359, Normal      SUMAtriptan (IMITREX) 100 mg tablet Take 1 tablet (100 mg total) by mouth once as needed for migraine for up to 1 dose may repeat in 2 hours if necessary. Max dose 200mg in 24 hour period., Starting Wed 2/12/2025, Normal           No discharge procedures on file.  ED SEPSIS DOCUMENTATION   Time reflects when diagnosis was documented in both MDM as applicable and the Disposition within this note       Time User Action Codes Description Comment    5/9/2025 12:10 AM Talha Lennon [K59.00] Constipation, unspecified constipation type     5/9/2025 12:34 AM Talha Lennon [R11.2] Nausea and vomiting, unspecified vomiting type                  Talha Lennon DO  05/09/25 0039

## 2025-05-11 NOTE — ED ATTENDING ATTESTATION
5/8/2025   IIra MD, saw and evaluated the patient. I have discussed the patient with the resident/non-physician practitioner and agree with the resident's/non-physician practitioner's findings, Plan of Care, and MDM as documented in the resident's/non-physician practitioner's note, except where noted. All available labs and Radiology studies were reviewed.  I was present for key portions of any procedure(s) performed by the resident/non-physician practitioner and I was immediately available to provide assistance.       At this point I agree with the current assessment done in the Emergency Department.  I have conducted an independent evaluation of this patient a history and physical is as follows:    Unit/Bed#: ED-19 Encounter: [id number]    Chief Complaint   Patient presents with    Abdominal Pain     Pt reports abdominal pain since last Thursday. Vomiting since Monday. Reports increased gas and constipation.              Physical Exam  ED Triage Vitals [05/08/25 2029]   Temperature Pulse Respirations Blood Pressure SpO2   97.9 °F (36.6 °C) 86 18 118/69 96 %      Temp Source Heart Rate Source Patient Position - Orthostatic VS BP Location FiO2 (%)   Oral Monitor -- -- --      Pain Score       --           Vital signs and nursing notes reviewed    ** IF YOU ARE READING THIS, THE EXAM TEMPLATE BELOW HAS NOT BEEN UPDATED**    CONSTITUTIONAL: female appearing stated age resting in bed, in no acute distress  HEENT: atraumatic, normocephalic. Sclera anicteric, conjunctiva are not injected. Moist oral mucosa  CARDIOVASCULAR/CHEST: RRR, no M/R/G. 2+ radial pulses  PULMONARY: Breathing comfortably on RA. Breath sounds are equal and clear to auscultation  ABDOMEN: non-distended. BS present, normoactive. Non-tender  MSK: moves all extremities, no deformities, no peripheral edema, no calf asymmetry  NEURO: Awake, alert, and oriented x 3. Face symmetric. Moves all extremities spontaneously. No focal neurologic  deficits  SKIN: Warm, appears well-perfused  MENTAL STATUS: Normal affect      Labs and Imaging  Labs Reviewed   COMPREHENSIVE METABOLIC PANEL - Abnormal       Result Value Ref Range Status    Sodium 135  135 - 147 mmol/L Final    Potassium 3.5  3.5 - 5.3 mmol/L Final    Chloride 103  96 - 108 mmol/L Final    CO2 23  21 - 32 mmol/L Final    ANION GAP 9  4 - 13 mmol/L Final    BUN 12  5 - 25 mg/dL Final    Creatinine 0.55 (*) 0.60 - 1.30 mg/dL Final    Comment: Standardized to IDMS reference method    Glucose 102  65 - 140 mg/dL Final    Comment: If the patient is fasting, the ADA then defines impaired fasting glucose as > 100 mg/dL and diabetes as > or equal to 123 mg/dL.    Calcium 8.6  8.4 - 10.2 mg/dL Final    AST 18  13 - 39 U/L Final    ALT 13  7 - 52 U/L Final    Comment: Specimen collection should occur prior to Sulfasalazine administration due to the potential for falsely depressed results.     Alkaline Phosphatase 29 (*) 34 - 104 U/L Final    Total Protein 6.4  6.4 - 8.4 g/dL Final    Albumin 4.0  3.5 - 5.0 g/dL Final    Total Bilirubin 0.40  0.20 - 1.00 mg/dL Final    Comment: Use of this assay is not recommended for patients undergoing treatment with eltrombopag due to the potential for falsely elevated results.  N-acetyl-p-benzoquinone imine (metabolite of Acetaminophen) will generate erroneously low results in samples for patients that have taken an overdose of Acetaminophen.    eGFR 132  ml/min/1.73sq m Final    Narrative:     National Kidney Disease Foundation guidelines for Chronic Kidney Disease (CKD):     Stage 1 with normal or high GFR (GFR > 90 mL/min/1.73 square meters)    Stage 2 Mild CKD (GFR = 60-89 mL/min/1.73 square meters)    Stage 3A Moderate CKD (GFR = 45-59 mL/min/1.73 square meters)    Stage 3B Moderate CKD (GFR = 30-44 mL/min/1.73 square meters)    Stage 4 Severe CKD (GFR = 15-29 mL/min/1.73 square meters)    Stage 5 End Stage CKD (GFR <15 mL/min/1.73 square meters)  Note: GFR  calculation is accurate only with a steady state creatinine   UA W REFLEX TO MICROSCOPIC WITH REFLEX TO CULTURE - Abnormal    Color, UA Yellow   Final    Clarity, UA Clear   Final    Specific Gravity, UA 1.023  1.003 - 1.030 Final    pH, UA 7.0  4.5, 5.0, 5.5, 6.0, 6.5, 7.0, 7.5, 8.0 Final    Leukocytes, UA Negative  Negative Final    Nitrite, UA Negative  Negative Final    Protein, UA Trace (*) Negative mg/dl Final    Glucose, UA Negative  Negative mg/dl Final    Ketones, UA Negative  Negative mg/dl Final    Urobilinogen, UA 3.0 (*) <2.0 mg/dl mg/dl Final    Bilirubin, UA Negative  Negative Final    Occult Blood, UA Small (*) Negative Final   URINE MICROSCOPIC - Abnormal    RBC, UA 10-20 (*) None Seen, 1-2 /hpf Final    WBC, UA None Seen  None Seen, 1-2 /hpf Final    Epithelial Cells Occasional  None Seen, Occasional /hpf Final    Bacteria, UA Occasional  None Seen, Occasional /hpf Final    MUCUS THREADS Occasional (*) None Seen Final   LIPASE - Normal    Lipase 15  11 - 82 u/L Final   POCT PREGNANCY, URINE - Normal    EXT Preg Test, Ur Negative   Final    Control Valid   Final   CBC AND DIFFERENTIAL    WBC 6.11  4.31 - 10.16 Thousand/uL Final    RBC 4.18  3.81 - 5.12 Million/uL Final    Hemoglobin 13.4  11.5 - 15.4 g/dL Final    Hematocrit 38.4  34.8 - 46.1 % Final    MCV 92  82 - 98 fL Final    MCH 32.1  26.8 - 34.3 pg Final    MCHC 34.9  31.4 - 37.4 g/dL Final    RDW 11.9  11.6 - 15.1 % Final    MPV 11.8  8.9 - 12.7 fL Final    Platelets 215  149 - 390 Thousands/uL Final    nRBC 0  /100 WBCs Final    Segmented % 70  43 - 75 % Final    Immature Grans % 0  0 - 2 % Final    Lymphocytes % 20  14 - 44 % Final    Monocytes % 8  4 - 12 % Final    Eosinophils Relative 2  0 - 6 % Final    Basophils Relative 0  0 - 1 % Final    Absolute Neutrophils 4.25  1.85 - 7.62 Thousands/µL Final    Absolute Immature Grans 0.02  0.00 - 0.20 Thousand/uL Final    Absolute Lymphocytes 1.19  0.60 - 4.47 Thousands/µL Final    Absolute  Monocytes 0.49  0.17 - 1.22 Thousand/µL Final    Eosinophils Absolute 0.14  0.00 - 0.61 Thousand/µL Final    Basophils Absolute 0.02  0.00 - 0.10 Thousands/µL Final       CT abdomen pelvis with contrast   Final Result      No acute abdominopelvic pathology. No bowel obstruction         Workstation performed: [id number]               Procedures  Procedures        ED Course  Medications   sodium chloride 0.9 % bolus 1,000 mL (0 mL Intravenous Stopped 5/9/25 0026)   ondansetron (ZOFRAN) injection 4 mg (4 mg Intravenous Given 5/8/25 2132)   iohexol (OMNIPAQUE) 350 MG/ML injection (MULTI-DOSE) 85 mL (85 mL Intravenous Given 5/8/25 2215)

## 2025-05-11 NOTE — ED ATTENDING ATTESTATION
5/8/2025   IIra MD, saw and evaluated the patient. I have discussed the patient with the resident/non-physician practitioner and agree with the resident's/non-physician practitioner's findings, Plan of Care, and MDM as documented in the resident's/non-physician practitioner's note, except where noted. All available labs and Radiology studies were reviewed.  I was present for key portions of any procedure(s) performed by the resident/non-physician practitioner and I was immediately available to provide assistance.       At this point I agree with the current assessment done in the Emergency Department.  I have conducted an independent evaluation of this patient a history and physical is as follows:    Unit/Bed#: ED-19 Encounter: [id number]    Chief Complaint   Patient presents with    Abdominal Pain     Pt reports abdominal pain since last Thursday. Vomiting since Monday. Reports increased gas and constipation.              Physical Exam  ED Triage Vitals [05/08/25 2029]   Temperature Pulse Respirations Blood Pressure SpO2   97.9 °F (36.6 °C) 86 18 118/69 96 %      Temp Source Heart Rate Source Patient Position - Orthostatic VS BP Location FiO2 (%)   Oral Monitor -- -- --      Pain Score       --           Vital signs and nursing notes reviewed    ** IF YOU ARE READING THIS, THE EXAM TEMPLATE BELOW HAS NOT BEEN UPDATED**    CONSTITUTIONAL: female appearing stated age resting in bed, in no acute distress  HEENT: atraumatic, normocephalic. Sclera anicteric, conjunctiva are not injected. Moist oral mucosa  CARDIOVASCULAR/CHEST: RRR, no M/R/G. 2+ radial pulses  PULMONARY: Breathing comfortably on RA. Breath sounds are equal and clear to auscultation  ABDOMEN: non-distended. BS present, normoactive. Non-tender  MSK: moves all extremities, no deformities, no peripheral edema, no calf asymmetry  NEURO: Awake, alert, and oriented x 3. Face symmetric. Moves all extremities spontaneously. No focal neurologic  deficits  SKIN: Warm, appears well-perfused  MENTAL STATUS: Normal affect      Labs and Imaging  Labs Reviewed   COMPREHENSIVE METABOLIC PANEL - Abnormal       Result Value Ref Range Status    Sodium 135  135 - 147 mmol/L Final    Potassium 3.5  3.5 - 5.3 mmol/L Final    Chloride 103  96 - 108 mmol/L Final    CO2 23  21 - 32 mmol/L Final    ANION GAP 9  4 - 13 mmol/L Final    BUN 12  5 - 25 mg/dL Final    Creatinine 0.55 (*) 0.60 - 1.30 mg/dL Final    Comment: Standardized to IDMS reference method    Glucose 102  65 - 140 mg/dL Final    Comment: If the patient is fasting, the ADA then defines impaired fasting glucose as > 100 mg/dL and diabetes as > or equal to 123 mg/dL.    Calcium 8.6  8.4 - 10.2 mg/dL Final    AST 18  13 - 39 U/L Final    ALT 13  7 - 52 U/L Final    Comment: Specimen collection should occur prior to Sulfasalazine administration due to the potential for falsely depressed results.     Alkaline Phosphatase 29 (*) 34 - 104 U/L Final    Total Protein 6.4  6.4 - 8.4 g/dL Final    Albumin 4.0  3.5 - 5.0 g/dL Final    Total Bilirubin 0.40  0.20 - 1.00 mg/dL Final    Comment: Use of this assay is not recommended for patients undergoing treatment with eltrombopag due to the potential for falsely elevated results.  N-acetyl-p-benzoquinone imine (metabolite of Acetaminophen) will generate erroneously low results in samples for patients that have taken an overdose of Acetaminophen.    eGFR 132  ml/min/1.73sq m Final    Narrative:     National Kidney Disease Foundation guidelines for Chronic Kidney Disease (CKD):     Stage 1 with normal or high GFR (GFR > 90 mL/min/1.73 square meters)    Stage 2 Mild CKD (GFR = 60-89 mL/min/1.73 square meters)    Stage 3A Moderate CKD (GFR = 45-59 mL/min/1.73 square meters)    Stage 3B Moderate CKD (GFR = 30-44 mL/min/1.73 square meters)    Stage 4 Severe CKD (GFR = 15-29 mL/min/1.73 square meters)    Stage 5 End Stage CKD (GFR <15 mL/min/1.73 square meters)  Note: GFR  calculation is accurate only with a steady state creatinine   UA W REFLEX TO MICROSCOPIC WITH REFLEX TO CULTURE - Abnormal    Color, UA Yellow   Final    Clarity, UA Clear   Final    Specific Gravity, UA 1.023  1.003 - 1.030 Final    pH, UA 7.0  4.5, 5.0, 5.5, 6.0, 6.5, 7.0, 7.5, 8.0 Final    Leukocytes, UA Negative  Negative Final    Nitrite, UA Negative  Negative Final    Protein, UA Trace (*) Negative mg/dl Final    Glucose, UA Negative  Negative mg/dl Final    Ketones, UA Negative  Negative mg/dl Final    Urobilinogen, UA 3.0 (*) <2.0 mg/dl mg/dl Final    Bilirubin, UA Negative  Negative Final    Occult Blood, UA Small (*) Negative Final   URINE MICROSCOPIC - Abnormal    RBC, UA 10-20 (*) None Seen, 1-2 /hpf Final    WBC, UA None Seen  None Seen, 1-2 /hpf Final    Epithelial Cells Occasional  None Seen, Occasional /hpf Final    Bacteria, UA Occasional  None Seen, Occasional /hpf Final    MUCUS THREADS Occasional (*) None Seen Final   LIPASE - Normal    Lipase 15  11 - 82 u/L Final   POCT PREGNANCY, URINE - Normal    EXT Preg Test, Ur Negative   Final    Control Valid   Final   CBC AND DIFFERENTIAL    WBC 6.11  4.31 - 10.16 Thousand/uL Final    RBC 4.18  3.81 - 5.12 Million/uL Final    Hemoglobin 13.4  11.5 - 15.4 g/dL Final    Hematocrit 38.4  34.8 - 46.1 % Final    MCV 92  82 - 98 fL Final    MCH 32.1  26.8 - 34.3 pg Final    MCHC 34.9  31.4 - 37.4 g/dL Final    RDW 11.9  11.6 - 15.1 % Final    MPV 11.8  8.9 - 12.7 fL Final    Platelets 215  149 - 390 Thousands/uL Final    nRBC 0  /100 WBCs Final    Segmented % 70  43 - 75 % Final    Immature Grans % 0  0 - 2 % Final    Lymphocytes % 20  14 - 44 % Final    Monocytes % 8  4 - 12 % Final    Eosinophils Relative 2  0 - 6 % Final    Basophils Relative 0  0 - 1 % Final    Absolute Neutrophils 4.25  1.85 - 7.62 Thousands/µL Final    Absolute Immature Grans 0.02  0.00 - 0.20 Thousand/uL Final    Absolute Lymphocytes 1.19  0.60 - 4.47 Thousands/µL Final    Absolute  Monocytes 0.49  0.17 - 1.22 Thousand/µL Final    Eosinophils Absolute 0.14  0.00 - 0.61 Thousand/µL Final    Basophils Absolute 0.02  0.00 - 0.10 Thousands/µL Final       CT abdomen pelvis with contrast   Final Result      No acute abdominopelvic pathology. No bowel obstruction         Workstation performed: [id number]               Procedures  Procedures        ED Course  Medications   sodium chloride 0.9 % bolus 1,000 mL (0 mL Intravenous Stopped 5/9/25 0026)   ondansetron (ZOFRAN) injection 4 mg (4 mg Intravenous Given 5/8/25 2132)   iohexol (OMNIPAQUE) 350 MG/ML injection (MULTI-DOSE) 85 mL (85 mL Intravenous Given 5/8/25 2215)

## 2025-05-21 ENCOUNTER — VBI (OUTPATIENT)
Dept: ADMINISTRATIVE | Facility: OTHER | Age: 25
End: 2025-05-21

## 2025-05-21 NOTE — TELEPHONE ENCOUNTER
05/21/25 10:12 AM     Chart reviewed for Chlamydia Screening in Women was/were not submitted to the patient's insurance.     Virginia Hunt MA   [facility]

## 2025-06-03 ENCOUNTER — TELEPHONE (OUTPATIENT)
Age: 25
End: 2025-06-03

## 2025-06-04 ENCOUNTER — TELEPHONE (OUTPATIENT)
Age: 25
End: 2025-06-04

## 2025-06-04 NOTE — TELEPHONE ENCOUNTER
Spoke with patient in regards to her message. I informed her that I reviewed with Dr. Street and pre-medications were added to be given prior to the IV iron, benadryl, dexamethasone, and pepcid. Patient was agreeable to this and agreed to cancel her appt on 7/7. I also informed her to have someone drive her due to the premedications. Patient advised to call if she has any questions or concerns. Patient verbalized understanding and is in agreement with the plan.

## 2025-06-04 NOTE — TELEPHONE ENCOUNTER
Pt did not want to wait a year to see Dr. Street so she rescheduled her 4/21/26 appt to 7/7/25 at . She didn't want to hold two appt slots so she requested to move the year FU appt up. She was to see provider about her treatments.

## 2025-06-04 NOTE — TELEPHONE ENCOUNTER
Pt is following up with Dr. Street.    Since she had an allergic reaction with her last iron, pt is asking to have the iron that was given to her in 2022.  Per Infusion notes, she received Venofer that time as well.    Pt is asking for her infusion to be another type of Iron since she had an allergic rx to the Venofer.     Does pt need to keep her appt in July, or can she just have a different

## 2025-06-06 DIAGNOSIS — D50.0 IRON DEFICIENCY ANEMIA DUE TO CHRONIC BLOOD LOSS: Primary | ICD-10-CM

## 2025-06-06 RX ORDER — SODIUM CHLORIDE 9 MG/ML
20 INJECTION, SOLUTION INTRAVENOUS ONCE
Status: CANCELLED | OUTPATIENT
Start: 2025-06-10

## 2025-06-10 ENCOUNTER — NURSE TRIAGE (OUTPATIENT)
Age: 25
End: 2025-06-10

## 2025-06-10 ENCOUNTER — TELEPHONE (OUTPATIENT)
Dept: HEMATOLOGY ONCOLOGY | Facility: CLINIC | Age: 25
End: 2025-06-10

## 2025-06-10 ENCOUNTER — HOSPITAL ENCOUNTER (OUTPATIENT)
Dept: INFUSION CENTER | Facility: CLINIC | Age: 25
Discharge: HOME | End: 2025-06-10
Attending: INTERNAL MEDICINE
Payer: COMMERCIAL

## 2025-06-10 VITALS
SYSTOLIC BLOOD PRESSURE: 110 MMHG | TEMPERATURE: 96.9 F | RESPIRATION RATE: 16 BRPM | HEART RATE: 86 BPM | OXYGEN SATURATION: 99 % | DIASTOLIC BLOOD PRESSURE: 54 MMHG

## 2025-06-10 DIAGNOSIS — D50.0 IRON DEFICIENCY ANEMIA DUE TO CHRONIC BLOOD LOSS: Primary | ICD-10-CM

## 2025-06-10 PROCEDURE — 96375 TX/PRO/DX INJ NEW DRUG ADDON: CPT

## 2025-06-10 PROCEDURE — 96367 TX/PROPH/DG ADDL SEQ IV INF: CPT

## 2025-06-10 PROCEDURE — 96365 THER/PROPH/DIAG IV INF INIT: CPT

## 2025-06-10 RX ORDER — SODIUM CHLORIDE 9 MG/ML
20 INJECTION, SOLUTION INTRAVENOUS ONCE
Status: CANCELLED | OUTPATIENT
Start: 2025-06-17

## 2025-06-10 RX ORDER — SODIUM CHLORIDE 9 MG/ML
20 INJECTION, SOLUTION INTRAVENOUS ONCE
Status: COMPLETED | OUTPATIENT
Start: 2025-06-10 | End: 2025-06-10

## 2025-06-10 RX ADMIN — SODIUM CHLORIDE 20 ML/HR: 0.9 INJECTION, SOLUTION INTRAVENOUS at 08:17

## 2025-06-10 RX ADMIN — IRON SUCROSE 300 MG: 20 INJECTION, SOLUTION INTRAVENOUS at 09:35

## 2025-06-10 RX ADMIN — FAMOTIDINE 20 MG: 10 INJECTION INTRAVENOUS at 09:02

## 2025-06-10 RX ADMIN — DEXAMETHASONE SODIUM PHOSPHATE 10 MG: 10 INJECTION, SOLUTION INTRAMUSCULAR; INTRAVENOUS at 08:41

## 2025-06-10 RX ADMIN — DIPHENHYDRAMINE HYDROCHLORIDE 25 MG: 50 INJECTION, SOLUTION INTRAMUSCULAR; INTRAVENOUS at 08:19

## 2025-06-10 NOTE — TELEPHONE ENCOUNTER
"REASON FOR CONVERSATION: Rash    SYMPTOMS: red rash in bilateral legs    OTHER HEALTH INFORMATION: reports had rash from venofer infusion last month ago and had swelling in feet and had to be seen the ED.     PROTOCOL DISPOSITION: Home Care    CARE ADVICE PROVIDED: Call ems if any symptoms for anaphylaxis symptoms -discussed with pt.  Apply 1% Hydrocortisone cream as directed for relief.  Can use antihistamine such as benadryl as directed for itching.  Continue with daily moisturizing cream at least 2 times daily.    PRACTICE FOLLOW-UP: none         Reason for Disposition   Localized hives    Answer Assessment - Initial Assessment Questions  1. APPEARANCE: \"What does the rash look like?\"       Red rash with elevations.     2. LOCATION: \"Where is the rash located?\"       Bilateral feet to thighs and neck    3. NUMBER: \"How many hives are there?\"       Numerous on legs and 3 on neck    4. SIZE: \"How big are the hives?\" (e.g., inches, cm, compare to coins) \"Do they all look the same or do they vary in shape and size?\"       No bigger than a Nickel in size.  Small on neck    5. ONSET: \"When did the hives begin?\" (e.g., hours or days ago)       Onset about 20 minutes ago    6. ITCHING: \"Does it itch?\" If Yes, ask: \"How bad is the itch?\"  (e.g., none, mild, moderate, severe)      No    7. RECURRENT PROBLEM: \"Have you had hives before?\" If Yes, ask: \"When was the last time?\" and \"What happened that time?\"       No    8. TRIGGERS: \"Were you exposed to any new food, plant, cosmetic product or animal just before the hives began?\"      Venifer infusion today     9. OTHER SYMPTOMS: \"Do you have any other symptoms?\" (e.g., fever, tongue swelling, difficulty breathing, abdomen pain)      Skin feels warm, mucus in throat    Protocols used: Hives-Adult-[place]    "

## 2025-06-10 NOTE — TELEPHONE ENCOUNTER
Left VM for patient to let her know that I reviewed her reaction of leg rash with Dr. Street and she would like to end the iron infusions since this is the second time it happened, even with premedications. I advised her that we will see her in April 2026 with blood work prior. Callback number provided.

## 2025-06-12 ENCOUNTER — PATIENT MESSAGE (OUTPATIENT)
Dept: FAMILY MEDICINE CLINIC | Facility: CLINIC | Age: 25
End: 2025-06-12

## 2025-06-13 ENCOUNTER — OFFICE VISIT (OUTPATIENT)
Dept: FAMILY MEDICINE CLINIC | Facility: CLINIC | Age: 25
End: 2025-06-13
Payer: COMMERCIAL

## 2025-06-13 VITALS
TEMPERATURE: 98.7 F | OXYGEN SATURATION: 99 % | BODY MASS INDEX: 21.34 KG/M2 | WEIGHT: 113 LBS | HEIGHT: 61 IN | HEART RATE: 92 BPM | DIASTOLIC BLOOD PRESSURE: 48 MMHG | SYSTOLIC BLOOD PRESSURE: 80 MMHG

## 2025-06-13 DIAGNOSIS — L72.0 EPIDERMAL CYST: Primary | ICD-10-CM

## 2025-06-13 PROCEDURE — 99214 OFFICE O/P EST MOD 30 MIN: CPT | Performed by: FAMILY MEDICINE

## 2025-06-13 RX ORDER — CEPHALEXIN 500 MG/1
500 CAPSULE ORAL 2 TIMES DAILY
Qty: 14 CAPSULE | Refills: 0 | Status: SHIPPED | OUTPATIENT
Start: 2025-06-13 | End: 2025-06-20

## 2025-06-13 NOTE — PROGRESS NOTES
"Acute visit   Name: Ccee Butler      : 2000      MRN: [id number]  Encounter Provider: Parisa CATHY Kiesha,   Encounter Date: 2025   Encounter department: [facility]             Name: Cece Butler      : 2000      MRN: [id number]  Encounter Provider: Parisa Pop, DO  Encounter Date: 2025   Encounter department: [facility]    :  Assessment & Plan  Epidermal cyst  Cyst self ruptured and erythema and swelling has improved   Use warm compresses, apply mupiricin to area   Prescription Keflex   Call if persists or worsens.   If cyst does not resolve can refer to derm or plastics   Orders:    Ambulatory Referral to Dermatology; Future    cephalexin (KEFLEX) 500 mg capsule; Take 1 capsule (500 mg total) by mouth in the morning and 1 capsule (500 mg total) before bedtime. Do all this for 7 days.      Assessment & Plan      History of Present Illness              Follow up:   No follow-ups on file.   @[facility]@    Cece is a 25 y.o. female who presents today for follow up on chronic conditions.     Chief Complaint   Patient presents with    Facial Swelling     Started yesterday morning, looked like a pimple - popped while brushing her teeth, she squeezed \"white discharge\" out of it, but pt is concerned because her face was swollen.      History of Present Illness     Concerns today:  Cyst on her lip   Since scheduling visit it opened after eating   Thick white material came out followed by blood and yellow fluid.   Since then swelling much better, erythema improved   Still firm     Her brother in law  and attending his  this weekend.         Review of Systems  ROS:  all others negative - no chest pain, SOB, normal urine and bowels. no GERD. sleeping well. mood good.       PHQ-2/9 Depression Screening                Objective   BP (!) 80/48 (Patient Position: Sitting, Cuff Size: Standard)   Pulse 92   Temp 98.7 °F " "(37.1 °C) (Temporal)   Ht 5' 1\" (1.549 m)   Wt 51.3 kg (113 lb)   SpO2 99%   BMI 21.35 kg/m²     BP (!) 80/48 (Patient Position: Sitting, Cuff Size: Standard)   Pulse 92   Temp 98.7 °F (37.1 °C) (Temporal)   Ht 5' 1\" (1.549 m)   Wt 51.3 kg (113 lb)   SpO2 99%   BMI 21.35 kg/m²     BP Readings from Last 3 Encounters:   06/13/25 (!) 80/48   06/10/25 110/54   05/08/25 114/56     Wt Readings from Last 3 Encounters:   06/13/25 51.3 kg (113 lb)   05/06/25 50.8 kg (112 lb)   04/08/25 51.3 kg (113 lb)       Physical Exam  HENT:      Mouth/Throat:         Constitutional: she appears well-developed and well-nourished.   HENT: Head: Normocephalic.   Right Ear: External ear normal. Tympanic membrane normal.   Left Ear: External ear normal. Tympanic membrane normal.   Nose: Nose normal. No mucosal edema, No rhinorrhea.   Right sinus exhibits no maxillary sinus tenderness.   Left sinus exhibits no maxillary sinus tenderness.   Mouth/Throat: Oropharynx is clear and moist.   Eyes: Normal conjunctiva.  No erythema. No discharge.  Neck: No pain on exam. Neck supple.   Cardiovascular: Normal rate, regular rhythm and normal heart sounds.    Pulmonary/Chest: Effort normal and breath sounds normal. No wheezes. No rales. No rhonchi.   Abdominal: Soft. Bowel sounds are normal. There is no tenderness.   Musculoskeletal: she exhibits no edema.   Lymphadenopathy: she has no cervical adenopathy.   Neurological: she  is alert and oriented to person, place, and time.   Skin: Skin is warm and dry. No rashes.  Psychiatric: she  has a normal mood and affect. her behavior is normal. Thought content normal.   Vitals reviewed.      Current Medications:  Current Medications[1]       History of Present Illness   Objective   BP (!) 80/48 (Patient Position: Sitting, Cuff Size: Standard)   Pulse 92   Temp 98.7 °F (37.1 °C) (Temporal)   Ht 5' 1\" (1.549 m)   Wt 51.3 kg (113 lb)   SpO2 99%   BMI 21.35 kg/m²     Physical Exam      Results       "     [1]   Current Outpatient Medications   Medication Sig Dispense Refill    norelgestromin-ethinyl estradiol (ORTHO EVRA) 150-35 MCG/24HR Place 1 patch on the skin over 7 days once a week 12 patch 4    SUMAtriptan (IMITREX) 100 mg tablet Take 1 tablet (100 mg total) by mouth once as needed for migraine for up to 1 dose may repeat in 2 hours if necessary. Max dose 200mg in 24 hour period. 10 tablet 5    docusate sodium (COLACE) 100 mg capsule Take 1 capsule (100 mg total) by mouth in the morning for 5 days 5 capsule 0    EPINEPHrine (EPIPEN) 0.3 mg/0.3 mL SOAJ Inject 0.3 mL (0.3 mg total) into a muscle once for 1 dose (Patient not taking: Reported on 5/6/2025) 0.6 mL 0    famotidine (PEPCID) 20 mg tablet Take 1 tablet (20 mg total) by mouth daily for 7 days 7 tablet 0    ondansetron (ZOFRAN-ODT) 4 mg disintegrating tablet Take 1 tablet (4 mg total) by mouth every 6 (six) hours as needed for nausea for up to 5 days 20 tablet 0    ondansetron (ZOFRAN-ODT) 8 mg disintegrating tablet Take 1 tablet (8 mg total) by mouth every 8 (eight) hours as needed for nausea or vomiting for up to 10 days 30 tablet 0    polyethylene glycol (MIRALAX) 17 g packet Take 17 g by mouth daily for 5 days 85 g 0     No current facility-administered medications for this visit.

## 2025-06-13 NOTE — PATIENT INSTRUCTIONS
Dermatology Referral Names    [facility]  [address]  [address]  [place]  [address]   Phone:  [phone] Fax: [phone]     [facility]  [facility]  [address]  [address]  [place]  [address]   Phone:  [phone])     [facility]  [address]  [address]  [place]  [address]   Phone:  [phone]) Fax: [phone]     [facility]  [address] [place]  [address]   Phone:  [phone] Fax: [phone]     [x] [facility]   [facility]  Malorie Du MD, FAAD   Margo Llamas DO FAAD   Ellyn Smith MD, FAAD   [address], [address]   [place] [address] [phone]    Dr. Charles Dorsey  [address], [address], [place] [address]   [address], [place] [address]  [address] [place] [address] [phone]    [facility], [facility] [place]  [address]  [address]  [place]  [id number] [phone]           Fax:    [phone]    [facility],   [address], [address]   [place] [address] [phone]     Dr. Ellyn Jay   [address] [place] [address] [address],   [place] [address] [phone]     [facility]  [address], [address]  [place] [address] [phone]     [facility],  Dr. Fabian Patrick   [address] [place] [address] [phone]      [facility]   [address] [place] [address]   [phone]      [facility]  [address], [place] [address] [phone]     [facility]  [address] [place] [id number]  [phone]  [facility] [place] [address] [phone]     [facility]   Dr. Declan Singletary   [address] [address]   [phone]

## 2025-06-16 ENCOUNTER — HOSPITAL ENCOUNTER (OUTPATIENT)
Dept: INFUSION CENTER | Facility: CLINIC | Age: 25
End: 2025-06-16
Attending: INTERNAL MEDICINE

## 2025-06-20 ENCOUNTER — TELEPHONE (OUTPATIENT)
Age: 25
End: 2025-06-20

## 2025-06-20 DIAGNOSIS — N94.6 DYSMENORRHEA: Primary | ICD-10-CM

## 2025-06-20 RX ORDER — NORELGESTROMIN AND ETHINYL ESTRADIOL 35; 150 UG/MG; UG/MG
1 PATCH TRANSDERMAL WEEKLY
Qty: 12 PATCH | Refills: 3 | Status: SHIPPED | OUTPATIENT
Start: 2025-06-20 | End: 2025-06-26

## 2025-06-20 NOTE — TELEPHONE ENCOUNTER
Patient called in reference to her Xulane birth control patch. She tried to  her prescription at the pharmacy and it stated it was cancelled. Looking in the system, it seems it was cancelled by Family Medicine and not Roxana Snyder. I warm transferred to [facility] and spoke to Leonid. She is going to send high priority message to the physician to expedite the issue and get resolved.

## 2025-06-24 ENCOUNTER — TELEPHONE (OUTPATIENT)
Dept: OBGYN CLINIC | Facility: CLINIC | Age: 25
End: 2025-06-24

## 2025-06-26 ENCOUNTER — TELEPHONE (OUTPATIENT)
Dept: OBGYN CLINIC | Facility: CLINIC | Age: 25
End: 2025-06-26

## 2025-06-26 DIAGNOSIS — N94.6 DYSMENORRHEA: Primary | ICD-10-CM

## 2025-06-26 RX ORDER — NORELGESTROMIN AND ETHINYL ESTRADIOL 35; 150 UG/MG; UG/MG
1 PATCH TRANSDERMAL WEEKLY
Qty: 12 PATCH | Refills: 4 | Status: SHIPPED | OUTPATIENT
Start: 2025-06-26 | End: 2025-09-24

## 2025-06-26 NOTE — TELEPHONE ENCOUNTER
"An \"updated\" Prior Auth Follow up for Noresgestromin-Eth Estradiol 150-35MCG/24 HR Weekly Patches has been scanned  "

## 2025-07-01 ENCOUNTER — OFFICE VISIT (OUTPATIENT)
Dept: CARDIOLOGY CLINIC | Facility: CLINIC | Age: 25
End: 2025-07-01
Payer: COMMERCIAL

## 2025-07-01 VITALS
SYSTOLIC BLOOD PRESSURE: 100 MMHG | WEIGHT: 110 LBS | HEIGHT: 61 IN | BODY MASS INDEX: 20.77 KG/M2 | HEART RATE: 72 BPM | DIASTOLIC BLOOD PRESSURE: 66 MMHG

## 2025-07-01 DIAGNOSIS — R00.2 PALPITATIONS: ICD-10-CM

## 2025-07-01 DIAGNOSIS — Z82.49 FAMILY HISTORY OF HEART DISEASE: ICD-10-CM

## 2025-07-01 DIAGNOSIS — R00.1 BRADYCARDIA: Primary | ICD-10-CM

## 2025-07-01 PROCEDURE — 93000 ELECTROCARDIOGRAM COMPLETE: CPT | Performed by: INTERNAL MEDICINE

## 2025-07-01 PROCEDURE — 99204 OFFICE O/P NEW MOD 45 MIN: CPT | Performed by: INTERNAL MEDICINE

## 2025-07-01 NOTE — PROGRESS NOTES
[facility]    Name:Cece Butler   DOS: 7/1/2025     Chief Complaint:   Chief Complaint   Patient presents with    New Patient Visit    Slow Heart Rate     Was told she was sinus al in ER    Shortness of Breath     Sensation of shortness of breath but can breathe fine    Dizziness       HISTORY OF PRESENT ILLNESS:    HPI:  Cece Butler is a 25 y.o. female. She  has a past medical history of Less than 8 weeks gestation of pregnancy (02/15/2024), Varicella, and Von Willebrand disease (HCC).    She presents to establish care with a cardiologist at the recommendation of her PCP for evaluation of sinus bradycardia, palpitations, occasional dizziness.  Was evaluated in the emergency department on 5/2/2025 for an entirely separate issue, documented as a possible allergic reaction from iron infusion that she was receiving.  She was incidentally noted on EKG to have sinus bradycardia with a heart rate 59 bpm, and was advised by the ER physician to seek cardiac evaluation upon discharge.    She reports to me that she occasionally experiences palpitations described as her heart racing briefly for a few seconds, often associated with a sensation of dizziness.  On at least 1 occasion there has been a sensation of vision getting darker without loss of consciousness or true loss of vision.  She otherwise denies chest pain, exertional shortness of breath, diaphoresis, exertional dizziness, orthopnea, edema.  She has had no true syncope.    The patient has no personal prior cardiac history.  Family history is significant for grandparents with heart disease including MI and SVT, although there is no family history of early MI, valvular heart disease.       ROS    ROS: Pertinent positives and negatives as described in History of Present Illness. Remainder of a 14 point review of systems was negative.     Allergies[1]     Medications Ordered Prior to Encounter[2]    Past Medical History[3]    Past Surgical  "History[4]    Family History[5]    Social History     Socioeconomic History    Marital status: /Civil Union     Spouse name: Not on file    Number of children: Not on file    Years of education: Not on file    Highest education level: Not on file   Occupational History    Occupation: Student    Tobacco Use    Smoking status: Some Days     Passive exposure: Never    Smokeless tobacco: Never   Vaping Use    Vaping status: Former   Substance and Sexual Activity    Alcohol use: Never    Drug use: Never    Sexual activity: Yes     Partners: Male     Birth control/protection: None   Other Topics Concern    Not on file   Social History Narrative    Psych at kitchen in [employer]    ;  in the  and 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         Social Drivers of Health     Financial Resource Strain: Not on file   Food Insecurity: No Food Insecurity (6/13/2025)    Nursing - Inadequate Food Risk Classification     Worried About Running Out of Food in the Last Year: Never true     Ran Out of Food in the Last Year: Never true     Ran Out of Food in the Last Year: Not on file   Transportation Needs: No Transportation Needs (6/13/2025)    PRAPARE - Transportation     Lack of Transportation (Medical): No     Lack of Transportation (Non-Medical): No   Physical Activity: Not on file   Stress: Not on file   Social Connections: Unknown (6/18/2024)    Received from [employer]    Social [employer]     How often do you feel lonely or isolated from those around you? (Adult - for ages 18 years and over): Not on file   Intimate Partner Violence: Not on file   Housing Stability: Low Risk  (6/13/2025)    Housing Stability Vital Sign     Unable to Pay for Housing in the Last Year: No     Number of Times Moved in the Last Year: 0     Homeless in the Last Year: No       OBJECTIVE:    /66 (BP Location: Left arm, Patient Position: Sitting, Cuff Size: Standard)   Pulse 72   Ht 5' 1\" (1.549 m)   Wt 49.9 kg (110 lb)   BMI " 20.78 kg/m²      BP Readings from Last 3 Encounters:   07/01/25 100/66   06/13/25 (!) 80/48   06/10/25 110/54       Wt Readings from Last 3 Encounters:   07/01/25 49.9 kg (110 lb)   06/13/25 51.3 kg (113 lb)   05/06/25 50.8 kg (112 lb)         Physical Exam  Vitals reviewed.   Constitutional:       General: She is not in acute distress.     Appearance: Normal appearance. She is not diaphoretic.   HENT:      Head: Normocephalic and atraumatic.     Eyes:      Conjunctiva/sclera: Conjunctivae normal.     Neck:      Vascular: No JVD.     Cardiovascular:      Rate and Rhythm: Normal rate and regular rhythm.      Pulses: Normal pulses.      Heart sounds: Normal heart sounds. No murmur heard.     No friction rub. No gallop.   Pulmonary:      Effort: Pulmonary effort is normal.      Breath sounds: Normal breath sounds. No wheezing, rhonchi or rales.   Abdominal:      General: Abdomen is flat. Bowel sounds are normal. There is no distension.     Musculoskeletal:      Right lower leg: No edema.      Left lower leg: No edema.     Skin:     General: Skin is warm and dry.     Neurological:      General: No focal deficit present.      Mental Status: She is alert and oriented to person, place, and time.     Psychiatric:         Mood and Affect: Mood normal.         Behavior: Behavior normal.                                                       Cardiac testing:   EKG reviewed personally: Normal sinus rhythm with sinus arrhythmia    I personally reviewed her EKG dated 5/2/25. This demonstrates sinus bradycardia, HR 59 bpm.     LABS:  Lab Results   Component Value Date    BUN 12 05/08/2025    CREATININE 0.55 (L) 05/08/2025    CALCIUM 8.6 05/08/2025    K 3.5 05/08/2025    CO2 23 05/08/2025     05/08/2025    ALKPHOS 29 (L) 05/08/2025    AST 18 05/08/2025    ALT 13 05/08/2025        Lab Results   Component Value Date    WBC 6.11 05/08/2025    HGB 13.4 05/08/2025    HCT 38.4 05/08/2025    MCV 92 05/08/2025     05/08/2025  "      Lab Results   Component Value Date    HDL 52 01/10/2024    LDLCALC 105 (H) 01/10/2024    TRIG 85 01/10/2024           ASSESSMENT/PLAN:  Diagnoses and all orders for this visit:    Bradycardia  -     POCT ECG  -     Holter monitor; Future  -     TSH, 3rd generation with Free T4 reflex; Future    Palpitations  -     TSH, 3rd generation with Free T4 reflex; Future    Family history of heart disease  Comments:  SVT and MI in grandparents    25-year-old female presenting for evaluation of asymptomatic sinus bradycardia noted incidentally during ER evaluation for possible transfusion reaction.  Biochemical workup by labs has been grossly normal, although she has not had recent thyroid function testing and I have recommended this is a one-time check.  Her heart rate and rhythm are normal in office today, and she has no concerning symptoms presently.  She does report occasional palpitations and dizziness, for which I have recommended a 48-hour cardiac rhythm monitor to exclude underlying arrhythmias.  The patient reports that the symptoms occur on a nearly daily basis, so 48 hours of monitoring should theoretically be sufficient for this. She will follow up for result review.             Nazario Holloway MD [facility]      Portions of the record may have been created with voice recognition software. Occasional wrong word or \"sound alike\" substitutions may have occurred due to the inherent limitations of voice recognition software. Please review the chart carefully and recognize, using context, where substitutions/typographical errors may have occurred.          [1]   Allergies  Allergen Reactions    Other Hives     seafood    Shellfish Allergy - Food Allergy Hives   [2]   Current Outpatient Medications on File Prior to Visit   Medication Sig Dispense Refill    norelgestromin-ethinyl estradiol (Xulane) 150-35 MCG/24HR Place 1 patch on the skin once a week over 7 days 12 patch 4    SUMAtriptan (IMITREX) 100 mg tablet Take 1 " tablet (100 mg total) by mouth once as needed for migraine for up to 1 dose may repeat in 2 hours if necessary. Max dose 200mg in 24 hour period. 10 tablet 5     No current facility-administered medications on file prior to visit.   [3]   Past Medical History:  Diagnosis Date    Less than 8 weeks gestation of pregnancy 02/15/2024    Varicella     immunized    Von Willebrand disease (HCC)    [4]   Past Surgical History:  Procedure Laterality Date     SECTION  22    [facility]  DELIVERY ONLY N/A 2022    Procedure:  SECTION ();  Surgeon: Didi Almazan MD;  Location: [facility] ;  Service: Obstetrics   [5]   Family History  Problem Relation Name Age of Onset    No Known Problems Mother      COPD Father      No Known Problems Sister      No Known Problems Maternal Grandmother      Supraventricular tachycardia Maternal Grandfather      No Known Problems Paternal Grandmother      Prostate cancer Paternal Grandfather nino lechuga     COPD Paternal Grandfather nino lechuga

## 2025-07-01 NOTE — PATIENT INSTRUCTIONS
You were seen today in the Cardiology office for evaluation of bradycardia and palpitations.     Below is a summary of the recommendations based upon today's visit:    1. Dietary modifications - Follow a Mediterranean diet - one that is low in saturated fats (avoid red meat, dairy, cheeses), emphasize chicken, fish, turkey, tofu, vegetables, fruit (moderate quantities); also avoid simple carbs/starch/sugars, use whole wheat/grain/bran/oatmeal, snack on small quantities of nuts and fruits.     2. Exercise is very important. Ideally, AT LEAST four to five times weekly for 30 to 60 minutes per session - both cardiovascular and resistance work is OK. Listen to your body and rest when needed.    3. Continue all present medications.    4. Testing prior to your next visit includes: Holter monitor, blood work    5. Remember the ABCDEs to cardiovascular health for patients:  A: Awareness of cardiovascular symptoms  B: Blood pressure control  C: Cholesterol control  C: Cigarette avoidance  D: Diabetes control  D: Dietary choices  E: Exercise    6. Return in 2 months     Any testing ordered in office today will be available for patient review via [employer], and reviewed with me at the follow-up office visit as scheduled.    Thank you for choosing [facility].    Please call our office or use [phone] with any questions.

## 2025-07-08 ENCOUNTER — HOSPITAL ENCOUNTER (OUTPATIENT)
Dept: NON INVASIVE DIAGNOSTICS | Facility: CLINIC | Age: 25
Discharge: HOME | End: 2025-07-08
Attending: INTERNAL MEDICINE
Payer: COMMERCIAL

## 2025-07-08 DIAGNOSIS — R00.1 BRADYCARDIA: ICD-10-CM

## 2025-07-08 PROCEDURE — 93225 XTRNL ECG REC<48 HRS REC: CPT

## 2025-07-08 PROCEDURE — 93226 XTRNL ECG REC<48 HR SCAN A/R: CPT

## 2025-07-15 ENCOUNTER — RESULTS FOLLOW-UP (OUTPATIENT)
Dept: FAMILY MEDICINE CLINIC | Facility: CLINIC | Age: 25
End: 2025-07-15

## 2025-07-15 ENCOUNTER — APPOINTMENT (OUTPATIENT)
Dept: LAB | Facility: CLINIC | Age: 25
End: 2025-07-15
Attending: FAMILY MEDICINE
Payer: COMMERCIAL

## 2025-07-15 DIAGNOSIS — R00.1 BRADYCARDIA: ICD-10-CM

## 2025-07-15 DIAGNOSIS — R00.2 PALPITATIONS: ICD-10-CM

## 2025-07-15 DIAGNOSIS — R31.29 OTHER MICROSCOPIC HEMATURIA: Primary | ICD-10-CM

## 2025-07-15 LAB
BACTERIA UR QL AUTO: ABNORMAL /HPF
BILIRUB UR QL STRIP: NEGATIVE
CLARITY UR: ABNORMAL
COLOR UR: YELLOW
GLUCOSE UR STRIP-MCNC: NEGATIVE MG/DL
HGB UR QL STRIP.AUTO: ABNORMAL
KETONES UR STRIP-MCNC: NEGATIVE MG/DL
LEUKOCYTE ESTERASE UR QL STRIP: NEGATIVE
MUCOUS THREADS UR QL AUTO: ABNORMAL
NITRITE UR QL STRIP: NEGATIVE
NON-SQ EPI CELLS URNS QL MICRO: ABNORMAL /HPF
PH UR STRIP.AUTO: 5.5 [PH]
PROT UR STRIP-MCNC: ABNORMAL MG/DL
RBC #/AREA URNS AUTO: ABNORMAL /HPF
SP GR UR STRIP.AUTO: 1.03 (ref 1–1.03)
TSH SERPL DL<=0.05 MIU/L-ACNC: 1.18 UIU/ML (ref 0.45–4.5)
UROBILINOGEN UR STRIP-ACNC: <2 MG/DL
WBC #/AREA URNS AUTO: ABNORMAL /HPF

## 2025-07-15 PROCEDURE — 36415 COLL VENOUS BLD VENIPUNCTURE: CPT

## 2025-07-15 PROCEDURE — 93227 XTRNL ECG REC<48 HR R&I: CPT | Performed by: INTERNAL MEDICINE

## 2025-07-15 PROCEDURE — 84443 ASSAY THYROID STIM HORMONE: CPT

## 2025-07-16 LAB — BACTERIA UR CULT: NORMAL

## 2025-07-16 NOTE — RESULT ENCOUNTER NOTE
Urine culture is negative for urinary tract infection.  Continue plan of care-urology consult due to blood in urine.  Urology consult placed.      Message sent to patient via [employer] [facility] patient portal.    Nurse to also call patient.

## 2025-07-17 ENCOUNTER — TELEPHONE (OUTPATIENT)
Age: 25
End: 2025-07-17

## 2025-07-17 NOTE — TELEPHONE ENCOUNTER
New Patient      Insurance   Current Insurance?[employer]   Insurance E-verified? Yes    History   Reason for appointment/active symptoms?  Other microscopic hematuria      Has the patient had any previous Urologist(s)? No     Was the patient seen in the ED? Yes 5/2025     Labs/Imaging(Including Out Of Network)? Yes [facility] labs and CT     Records Requested? Yes  Records Visible in EPIC? Yes     Personal history of cancer? No     Appointment   Office location preference:  [place]  ?   Appointment Details (patient scheduled through [employer]  Date:  8/19  Time:  2:45  Location:  [place]   Provider:  Neri  Does the appointment need further review? Yes for sooner appointment with [facility] if possible - patient is leaving for school at end of July and will be back by 8/19 but is not scheduled within 6 week decision tree time frame

## 2025-07-21 NOTE — TELEPHONE ENCOUNTER
Checked through the schedules of Jose and Jon and do not see any openings with [facility]. Will keep looking.

## 2025-07-25 DIAGNOSIS — N94.6 DYSMENORRHEA: ICD-10-CM

## 2025-07-25 RX ORDER — NORELGESTROMIN AND ETHINYL ESTRADIOL 150; 35 UG/D; UG/D
1 PATCH TRANSDERMAL WEEKLY
Qty: 12 PATCH | Refills: 4 | Status: SHIPPED | OUTPATIENT
Start: 2025-07-25

## 2025-08-13 ENCOUNTER — NURSE TRIAGE (OUTPATIENT)
Age: 25
End: 2025-08-13

## 2025-08-15 ENCOUNTER — OFFICE VISIT (OUTPATIENT)
Dept: UROLOGY | Facility: MEDICAL CENTER | Age: 25
End: 2025-08-15
Payer: OTHER GOVERNMENT